# Patient Record
Sex: FEMALE | Race: WHITE | Employment: OTHER | URBAN - METROPOLITAN AREA
[De-identification: names, ages, dates, MRNs, and addresses within clinical notes are randomized per-mention and may not be internally consistent; named-entity substitution may affect disease eponyms.]

---

## 2017-01-09 ENCOUNTER — TELEPHONE (OUTPATIENT)
Dept: FAMILY MEDICINE CLINIC | Age: 72
End: 2017-01-09

## 2017-01-10 ENCOUNTER — OFFICE VISIT (OUTPATIENT)
Dept: INFECTIOUS DISEASES | Age: 72
End: 2017-01-10

## 2017-01-10 VITALS
BODY MASS INDEX: 25.49 KG/M2 | WEIGHT: 153 LBS | HEART RATE: 60 BPM | TEMPERATURE: 98.5 F | HEIGHT: 65 IN | SYSTOLIC BLOOD PRESSURE: 122 MMHG | DIASTOLIC BLOOD PRESSURE: 82 MMHG

## 2017-01-10 DIAGNOSIS — R82.71 ASYMPTOMATIC BACTERIURIA: Primary | ICD-10-CM

## 2017-01-10 PROCEDURE — 99203 OFFICE O/P NEW LOW 30 MIN: CPT | Performed by: INTERNAL MEDICINE

## 2017-01-30 ENCOUNTER — OFFICE VISIT (OUTPATIENT)
Dept: FAMILY MEDICINE CLINIC | Age: 72
End: 2017-01-30

## 2017-01-30 VITALS
WEIGHT: 155.8 LBS | SYSTOLIC BLOOD PRESSURE: 126 MMHG | BODY MASS INDEX: 25.96 KG/M2 | OXYGEN SATURATION: 99 % | DIASTOLIC BLOOD PRESSURE: 78 MMHG | HEART RATE: 59 BPM | RESPIRATION RATE: 17 BRPM | HEIGHT: 65 IN

## 2017-01-30 DIAGNOSIS — R19.7 DIARRHEA, UNSPECIFIED TYPE: Primary | ICD-10-CM

## 2017-01-30 DIAGNOSIS — G47.00 INSOMNIA, UNSPECIFIED TYPE: ICD-10-CM

## 2017-01-30 DIAGNOSIS — R41.3 MEMORY DISORDER: ICD-10-CM

## 2017-01-30 PROCEDURE — 99213 OFFICE O/P EST LOW 20 MIN: CPT | Performed by: FAMILY MEDICINE

## 2017-01-30 RX ORDER — ZOLPIDEM TARTRATE 6.25 MG/1
TABLET, FILM COATED, EXTENDED RELEASE ORAL
Qty: 90 TABLET | Refills: 0 | Status: SHIPPED | OUTPATIENT
Start: 2017-01-30 | End: 2017-04-04 | Stop reason: SDUPTHER

## 2017-01-30 ASSESSMENT — ENCOUNTER SYMPTOMS
DIARRHEA: 1
RESPIRATORY NEGATIVE: 1
BACK PAIN: 1

## 2017-03-03 ENCOUNTER — TELEPHONE (OUTPATIENT)
Dept: FAMILY MEDICINE CLINIC | Age: 72
End: 2017-03-03

## 2017-03-03 RX ORDER — MEMANTINE HYDROCHLORIDE 5 MG-10 MG
KIT ORAL
Qty: 1 PACKAGE | Refills: 0 | Status: SHIPPED | OUTPATIENT
Start: 2017-03-03 | End: 2017-03-03

## 2017-03-06 ENCOUNTER — HOSPITAL ENCOUNTER (OUTPATIENT)
Dept: ENDOSCOPY | Age: 72
Discharge: HOME | End: 2017-03-06
Attending: INTERNAL MEDICINE | Admitting: INTERNAL MEDICINE

## 2017-03-06 RX ORDER — SODIUM CHLORIDE 9 MG/ML
INJECTION, SOLUTION INTRAVENOUS CONTINUOUS
Status: DISCONTINUED | OUTPATIENT
Start: 2017-03-06 | End: 2017-03-07 | Stop reason: HOSPADM

## 2017-03-06 RX ORDER — SODIUM CHLORIDE 0.9 % (FLUSH) 0.9 %
10 SYRINGE (ML) INJECTION EVERY 12 HOURS SCHEDULED
Status: DISCONTINUED | OUTPATIENT
Start: 2017-03-06 | End: 2017-03-07 | Stop reason: HOSPADM

## 2017-03-06 RX ORDER — SODIUM CHLORIDE 0.9 % (FLUSH) 0.9 %
10 SYRINGE (ML) INJECTION PRN
Status: DISCONTINUED | OUTPATIENT
Start: 2017-03-06 | End: 2017-03-07 | Stop reason: HOSPADM

## 2017-03-06 ASSESSMENT — ENCOUNTER SYMPTOMS: SHORTNESS OF BREATH: 0

## 2017-03-17 DIAGNOSIS — I10 ESSENTIAL HYPERTENSION: ICD-10-CM

## 2017-03-18 RX ORDER — VALSARTAN 160 MG/1
TABLET ORAL
Qty: 30 TABLET | Refills: 5 | Status: SHIPPED | OUTPATIENT
Start: 2017-03-18 | End: 2017-07-27 | Stop reason: SDUPTHER

## 2017-03-23 ENCOUNTER — OFFICE VISIT (OUTPATIENT)
Dept: SURGERY | Age: 72
End: 2017-03-23

## 2017-03-23 VITALS — WEIGHT: 159 LBS | BODY MASS INDEX: 26.46 KG/M2 | SYSTOLIC BLOOD PRESSURE: 132 MMHG | DIASTOLIC BLOOD PRESSURE: 84 MMHG

## 2017-03-23 DIAGNOSIS — K63.3 COLON ULCER: ICD-10-CM

## 2017-03-23 DIAGNOSIS — D12.6 HIGH GRADE DYSPLASIA IN COLONIC ADENOMA: Primary | ICD-10-CM

## 2017-03-23 PROCEDURE — 99214 OFFICE O/P EST MOD 30 MIN: CPT | Performed by: SURGERY

## 2017-03-23 RX ORDER — FAMOTIDINE 40 MG/1
40 TABLET, FILM COATED ORAL PRN
COMMUNITY
End: 2017-03-24 | Stop reason: ALTCHOICE

## 2017-03-23 RX ORDER — VALACYCLOVIR HYDROCHLORIDE 500 MG/1
1 TABLET, FILM COATED ORAL PRN
COMMUNITY
Start: 2017-01-26 | End: 2018-03-23

## 2017-03-23 ASSESSMENT — ENCOUNTER SYMPTOMS
GASTROINTESTINAL NEGATIVE: 1
ALLERGIC/IMMUNOLOGIC NEGATIVE: 1
RESPIRATORY NEGATIVE: 1
BACK PAIN: 1
EYES NEGATIVE: 1

## 2017-03-25 DIAGNOSIS — R41.3 MEMORY DISORDER: ICD-10-CM

## 2017-03-27 RX ORDER — DONEPEZIL HYDROCHLORIDE 10 MG/1
TABLET, FILM COATED ORAL
Qty: 30 TABLET | Refills: 5 | Status: SHIPPED | OUTPATIENT
Start: 2017-03-27 | End: 2017-04-04

## 2017-03-28 DIAGNOSIS — D12.6 HIGH GRADE DYSPLASIA IN COLONIC ADENOMA: Primary | ICD-10-CM

## 2017-03-28 DIAGNOSIS — Z01.818 PRE-OP TESTING: ICD-10-CM

## 2017-03-29 ENCOUNTER — HOSPITAL ENCOUNTER (OUTPATIENT)
Dept: CT IMAGING | Age: 72
Discharge: HOME | End: 2017-03-29
Attending: INTERNAL MEDICINE | Admitting: INTERNAL MEDICINE

## 2017-03-29 ENCOUNTER — HOSPITAL ENCOUNTER (OUTPATIENT)
Dept: OTHER | Age: 72
Discharge: HOME | End: 2017-03-29
Attending: SURGERY | Admitting: SURGERY

## 2017-03-29 DIAGNOSIS — D12.6 BENIGN NEOPLASM OF COLON: ICD-10-CM

## 2017-03-29 DIAGNOSIS — D12.6 HIGH GRADE DYSPLASIA IN COLONIC ADENOMA: ICD-10-CM

## 2017-03-29 LAB
A/G RATIO: 1.5 (ref 1.1–2.2)
ABO/RH: NORMAL
ALBUMIN SERPL-MCNC: 4 G/DL (ref 3.4–5)
ALP BLD-CCNC: 85 U/L (ref 40–129)
ALT SERPL-CCNC: 14 U/L (ref 10–40)
ANION GAP SERPL CALCULATED.3IONS-SCNC: 19 MMOL/L (ref 3–16)
ANTIBODY SCREEN: NORMAL
AST SERPL-CCNC: 20 U/L (ref 15–37)
BASOPHILS ABSOLUTE: 0.1 K/UL (ref 0–0.2)
BASOPHILS RELATIVE PERCENT: 1 %
BILIRUB SERPL-MCNC: 0.7 MG/DL (ref 0–1)
BUN BLDV-MCNC: 20 MG/DL (ref 7–20)
CALCIUM SERPL-MCNC: 9.7 MG/DL (ref 8.3–10.6)
CEA: 3.5 NG/ML (ref 0–5)
CHLORIDE BLD-SCNC: 100 MMOL/L (ref 99–110)
CO2: 23 MMOL/L (ref 21–32)
CREAT SERPL-MCNC: 0.6 MG/DL (ref 0.6–1.2)
EOSINOPHILS ABSOLUTE: 0.2 K/UL (ref 0–0.6)
EOSINOPHILS RELATIVE PERCENT: 4 %
GFR AFRICAN AMERICAN: >60
GFR NON-AFRICAN AMERICAN: >60
GLOBULIN: 2.7 G/DL
GLUCOSE BLD-MCNC: 94 MG/DL (ref 70–99)
HCT VFR BLD CALC: 42.8 % (ref 36–48)
HEMOGLOBIN: 14 G/DL (ref 12–16)
LYMPHOCYTES ABSOLUTE: 2.2 K/UL (ref 1–5.1)
LYMPHOCYTES RELATIVE PERCENT: 38 %
MCH RBC QN AUTO: 29.8 PG (ref 26–34)
MCHC RBC AUTO-ENTMCNC: 32.7 G/DL (ref 31–36)
MCV RBC AUTO: 91.1 FL (ref 80–100)
MONOCYTES ABSOLUTE: 0.5 K/UL (ref 0–1.3)
MONOCYTES RELATIVE PERCENT: 8.6 %
NEUTROPHILS ABSOLUTE: 2.8 K/UL (ref 1.7–7.7)
NEUTROPHILS RELATIVE PERCENT: 48.4 %
PDW BLD-RTO: 14.3 % (ref 12.4–15.4)
PLATELET # BLD: 272 K/UL (ref 135–450)
PMV BLD AUTO: 8.9 FL (ref 5–10.5)
POTASSIUM SERPL-SCNC: 4.1 MMOL/L (ref 3.5–5.1)
RBC # BLD: 4.7 M/UL (ref 4–5.2)
SODIUM BLD-SCNC: 142 MMOL/L (ref 136–145)
TOTAL PROTEIN: 6.7 G/DL (ref 6.4–8.2)
WBC # BLD: 5.7 K/UL (ref 4–11)

## 2017-04-04 ENCOUNTER — OFFICE VISIT (OUTPATIENT)
Dept: FAMILY MEDICINE CLINIC | Age: 72
End: 2017-04-04

## 2017-04-04 VITALS
SYSTOLIC BLOOD PRESSURE: 126 MMHG | DIASTOLIC BLOOD PRESSURE: 82 MMHG | HEIGHT: 65 IN | RESPIRATION RATE: 12 BRPM | WEIGHT: 156 LBS | OXYGEN SATURATION: 98 % | TEMPERATURE: 97.9 F | HEART RATE: 65 BPM | BODY MASS INDEX: 25.99 KG/M2

## 2017-04-04 DIAGNOSIS — G47.00 INSOMNIA, UNSPECIFIED TYPE: ICD-10-CM

## 2017-04-04 DIAGNOSIS — K58.9 IRRITABLE BOWEL SYNDROME WITHOUT DIARRHEA: ICD-10-CM

## 2017-04-04 DIAGNOSIS — K22.719 BARRETT'S ESOPHAGUS WITH DYSPLASIA: ICD-10-CM

## 2017-04-04 DIAGNOSIS — I10 ESSENTIAL HYPERTENSION: ICD-10-CM

## 2017-04-04 DIAGNOSIS — Z01.818 PRE-OP EXAMINATION: Primary | ICD-10-CM

## 2017-04-04 PROCEDURE — 93000 ELECTROCARDIOGRAM COMPLETE: CPT | Performed by: NURSE PRACTITIONER

## 2017-04-04 PROCEDURE — 99215 OFFICE O/P EST HI 40 MIN: CPT | Performed by: NURSE PRACTITIONER

## 2017-04-04 RX ORDER — ZOLPIDEM TARTRATE 6.25 MG/1
TABLET, FILM COATED, EXTENDED RELEASE ORAL
Qty: 90 TABLET | Refills: 0 | Status: SHIPPED | OUTPATIENT
Start: 2017-04-04 | End: 2017-08-08 | Stop reason: SDUPTHER

## 2017-04-13 ENCOUNTER — CARE COORDINATOR VISIT (OUTPATIENT)
Dept: CASE MANAGEMENT | Age: 72
End: 2017-04-13

## 2017-04-14 ENCOUNTER — CARE COORDINATION (OUTPATIENT)
Dept: CASE MANAGEMENT | Age: 72
End: 2017-04-14

## 2017-04-14 ENCOUNTER — TELEPHONE (OUTPATIENT)
Dept: PHARMACY | Facility: CLINIC | Age: 72
End: 2017-04-14

## 2017-04-15 ENCOUNTER — CARE COORDINATION (OUTPATIENT)
Dept: CASE MANAGEMENT | Age: 72
End: 2017-04-15

## 2017-04-16 ENCOUNTER — CARE COORDINATION (OUTPATIENT)
Dept: CASE MANAGEMENT | Age: 72
End: 2017-04-16

## 2017-04-17 ENCOUNTER — TELEPHONE (OUTPATIENT)
Dept: SURGERY | Age: 72
End: 2017-04-17

## 2017-04-18 ENCOUNTER — CARE COORDINATION (OUTPATIENT)
Dept: CASE MANAGEMENT | Age: 72
End: 2017-04-18

## 2017-04-26 ENCOUNTER — TELEPHONE (OUTPATIENT)
Dept: FAMILY MEDICINE CLINIC | Age: 72
End: 2017-04-26

## 2017-04-27 ENCOUNTER — OFFICE VISIT (OUTPATIENT)
Dept: SURGERY | Age: 72
End: 2017-04-27

## 2017-04-27 VITALS — WEIGHT: 149 LBS | SYSTOLIC BLOOD PRESSURE: 102 MMHG | BODY MASS INDEX: 24.79 KG/M2 | DIASTOLIC BLOOD PRESSURE: 70 MMHG

## 2017-04-27 DIAGNOSIS — C18.9 MALIGNANT NEOPLASM OF COLON, UNSPECIFIED PART OF COLON (HCC): ICD-10-CM

## 2017-04-27 DIAGNOSIS — D12.6 HIGH GRADE DYSPLASIA IN COLONIC ADENOMA: Primary | ICD-10-CM

## 2017-04-27 PROCEDURE — 99024 POSTOP FOLLOW-UP VISIT: CPT | Performed by: SURGERY

## 2017-05-01 ENCOUNTER — CARE COORDINATION (OUTPATIENT)
Dept: CASE MANAGEMENT | Age: 72
End: 2017-05-01

## 2017-05-02 ENCOUNTER — CARE COORDINATION (OUTPATIENT)
Dept: CASE MANAGEMENT | Age: 72
End: 2017-05-02

## 2017-05-05 ENCOUNTER — CARE COORDINATION (OUTPATIENT)
Dept: CASE MANAGEMENT | Age: 72
End: 2017-05-05

## 2017-05-08 ENCOUNTER — OFFICE VISIT (OUTPATIENT)
Dept: FAMILY MEDICINE CLINIC | Age: 72
End: 2017-05-08

## 2017-05-08 VITALS
RESPIRATION RATE: 14 BRPM | BODY MASS INDEX: 24.79 KG/M2 | HEIGHT: 65 IN | OXYGEN SATURATION: 98 % | HEART RATE: 60 BPM | WEIGHT: 148.8 LBS | DIASTOLIC BLOOD PRESSURE: 80 MMHG | SYSTOLIC BLOOD PRESSURE: 136 MMHG

## 2017-05-08 DIAGNOSIS — G47.09 OTHER INSOMNIA: Primary | ICD-10-CM

## 2017-05-08 PROCEDURE — G8510 SCR DEP NEG, NO PLAN REQD: HCPCS | Performed by: FAMILY MEDICINE

## 2017-05-08 PROCEDURE — 99213 OFFICE O/P EST LOW 20 MIN: CPT | Performed by: FAMILY MEDICINE

## 2017-05-08 PROCEDURE — 3288F FALL RISK ASSESSMENT DOCD: CPT | Performed by: FAMILY MEDICINE

## 2017-05-08 RX ORDER — TRAZODONE HYDROCHLORIDE 50 MG/1
TABLET ORAL
Qty: 60 TABLET | Refills: 3 | Status: SHIPPED | OUTPATIENT
Start: 2017-05-08 | End: 2018-04-17

## 2017-05-08 ASSESSMENT — ENCOUNTER SYMPTOMS
RESPIRATORY NEGATIVE: 1
GASTROINTESTINAL NEGATIVE: 1

## 2017-05-08 ASSESSMENT — PATIENT HEALTH QUESTIONNAIRE - PHQ9
SUM OF ALL RESPONSES TO PHQ9 QUESTIONS 1 & 2: 0
SUM OF ALL RESPONSES TO PHQ QUESTIONS 1-9: 0
2. FEELING DOWN, DEPRESSED OR HOPELESS: 0
1. LITTLE INTEREST OR PLEASURE IN DOING THINGS: 0

## 2017-05-16 ENCOUNTER — TELEPHONE (OUTPATIENT)
Dept: FAMILY MEDICINE CLINIC | Age: 72
End: 2017-05-16

## 2017-05-17 ENCOUNTER — TELEPHONE (OUTPATIENT)
Dept: FAMILY MEDICINE CLINIC | Age: 72
End: 2017-05-17

## 2017-05-17 RX ORDER — RAMELTEON 8 MG/1
8 TABLET ORAL NIGHTLY
Qty: 30 TABLET | Refills: 0 | Status: SHIPPED | OUTPATIENT
Start: 2017-05-17 | End: 2017-06-16

## 2017-06-03 PROBLEM — C18.4 MALIGNANT NEOPLASM OF TRANSVERSE COLON (HCC): Status: ACTIVE | Noted: 2017-06-03

## 2017-07-27 DIAGNOSIS — K22.719 BARRETT'S ESOPHAGUS WITH DYSPLASIA: ICD-10-CM

## 2017-07-27 DIAGNOSIS — G89.29 CHRONIC BILATERAL LOW BACK PAIN WITHOUT SCIATICA: ICD-10-CM

## 2017-07-27 DIAGNOSIS — I10 ESSENTIAL HYPERTENSION: ICD-10-CM

## 2017-07-27 DIAGNOSIS — M54.50 CHRONIC BILATERAL LOW BACK PAIN WITHOUT SCIATICA: ICD-10-CM

## 2017-07-27 RX ORDER — LANSOPRAZOLE 30 MG/1
CAPSULE, DELAYED RELEASE ORAL
Qty: 90 CAPSULE | Refills: 3 | Status: SHIPPED | OUTPATIENT
Start: 2017-07-27 | End: 2018-07-30 | Stop reason: SDUPTHER

## 2017-07-27 RX ORDER — CELECOXIB 200 MG/1
200 CAPSULE ORAL DAILY
Qty: 90 CAPSULE | Refills: 3 | Status: SHIPPED | OUTPATIENT
Start: 2017-07-27 | End: 2017-08-24 | Stop reason: SDUPTHER

## 2017-07-27 RX ORDER — DULOXETIN HYDROCHLORIDE 30 MG/1
30 CAPSULE, DELAYED RELEASE ORAL DAILY
Qty: 90 CAPSULE | Refills: 3 | Status: SHIPPED | OUTPATIENT
Start: 2017-07-27 | End: 2017-08-24 | Stop reason: SDUPTHER

## 2017-07-27 RX ORDER — VALSARTAN 160 MG/1
TABLET ORAL
Qty: 90 TABLET | Refills: 3 | Status: SHIPPED | OUTPATIENT
Start: 2017-07-27 | End: 2018-07-27 | Stop reason: SDUPTHER

## 2017-08-08 DIAGNOSIS — G47.00 INSOMNIA, UNSPECIFIED TYPE: ICD-10-CM

## 2017-08-08 RX ORDER — ZOLPIDEM TARTRATE 6.25 MG/1
TABLET, FILM COATED, EXTENDED RELEASE ORAL
Qty: 90 TABLET | Refills: 0 | Status: SHIPPED | OUTPATIENT
Start: 2017-08-08 | End: 2017-11-01 | Stop reason: SDUPTHER

## 2017-08-24 DIAGNOSIS — G89.29 CHRONIC BILATERAL LOW BACK PAIN WITHOUT SCIATICA: ICD-10-CM

## 2017-08-24 DIAGNOSIS — M54.50 CHRONIC BILATERAL LOW BACK PAIN WITHOUT SCIATICA: ICD-10-CM

## 2017-08-24 RX ORDER — DULOXETIN HYDROCHLORIDE 30 MG/1
30 CAPSULE, DELAYED RELEASE ORAL DAILY
Qty: 90 CAPSULE | Refills: 1 | Status: SHIPPED | OUTPATIENT
Start: 2017-08-24 | End: 2018-08-13 | Stop reason: SDUPTHER

## 2017-08-24 RX ORDER — CELECOXIB 200 MG/1
200 CAPSULE ORAL DAILY PRN
Qty: 90 CAPSULE | Refills: 1 | Status: SHIPPED | OUTPATIENT
Start: 2017-08-24 | End: 2018-05-14 | Stop reason: SDUPTHER

## 2017-09-05 ENCOUNTER — TELEPHONE (OUTPATIENT)
Dept: FAMILY MEDICINE CLINIC | Age: 72
End: 2017-09-05

## 2017-09-05 DIAGNOSIS — G47.00 INSOMNIA, UNSPECIFIED TYPE: ICD-10-CM

## 2017-09-05 DIAGNOSIS — G89.29 CHRONIC BILATERAL LOW BACK PAIN WITHOUT SCIATICA: ICD-10-CM

## 2017-09-05 DIAGNOSIS — M54.50 CHRONIC BILATERAL LOW BACK PAIN WITHOUT SCIATICA: ICD-10-CM

## 2017-09-05 RX ORDER — ZOLPIDEM TARTRATE 6.25 MG/1
TABLET, FILM COATED, EXTENDED RELEASE ORAL
Qty: 90 TABLET | Refills: 3 | Status: CANCELLED | OUTPATIENT
Start: 2017-09-05

## 2017-09-05 RX ORDER — DULOXETIN HYDROCHLORIDE 30 MG/1
30 CAPSULE, DELAYED RELEASE ORAL DAILY
Qty: 90 CAPSULE | Refills: 2 | Status: CANCELLED | OUTPATIENT
Start: 2017-09-05

## 2017-11-01 DIAGNOSIS — G47.00 INSOMNIA, UNSPECIFIED TYPE: ICD-10-CM

## 2017-11-01 RX ORDER — ZOLPIDEM TARTRATE 6.25 MG/1
TABLET, FILM COATED, EXTENDED RELEASE ORAL
Qty: 90 TABLET | Refills: 0 | Status: SHIPPED | OUTPATIENT
Start: 2017-11-01 | End: 2017-11-06 | Stop reason: SDUPTHER

## 2017-11-01 NOTE — TELEPHONE ENCOUNTER
Medication and Quantity requested: zolpidem (AMBIEN CR) 6.25 MG extended release tablet  Qty 90     Last Visit  5/8/17, Dr. Jefrfy Urena and phone number updated in EPIC:  yes    Pt states PA may be needed. [facility] provided pt with phone number [phone]. Pt states if PA is needed and not approved she wants medication sent to [address] [place] [address] on [place] [address].

## 2017-11-04 DIAGNOSIS — G47.00 INSOMNIA, UNSPECIFIED TYPE: ICD-10-CM

## 2017-11-06 DIAGNOSIS — G47.00 INSOMNIA, UNSPECIFIED TYPE: ICD-10-CM

## 2017-11-06 RX ORDER — ZOLPIDEM TARTRATE 6.25 MG/1
TABLET, FILM COATED, EXTENDED RELEASE ORAL
Qty: 30 TABLET | Refills: 0 | Status: SHIPPED | OUTPATIENT
Start: 2017-11-06 | End: 2017-11-07

## 2017-11-06 NOTE — TELEPHONE ENCOUNTER
Last refill: 11/01/2017, #90, 0 refills    Patient requesting a 30 day supply while she gets her #90 approved through [facility]. Dr. Dontrell Silveira is out of the office.

## 2017-11-06 NOTE — TELEPHONE ENCOUNTER
Patient is requesting a 30 day supply of Zolpidem sent to [facility]. Patient is out of medication and can not wait until PA is approved for medication at [address]. Patient is willing to pay full price for the RX at [facility] 5-8-17  Patient has appt.  On 11-17-17 w/

## 2017-11-07 RX ORDER — ZOLPIDEM TARTRATE 6.25 MG/1
TABLET, FILM COATED, EXTENDED RELEASE ORAL
Qty: 30 TABLET | Refills: 0 | Status: SHIPPED | OUTPATIENT
Start: 2017-11-07 | End: 2018-02-06 | Stop reason: SDUPTHER

## 2017-11-14 LAB
BILIRUBIN, URINE: NEGATIVE
BLOOD, URINE: NEGATIVE
CLARITY: ABNORMAL
COLOR: ABNORMAL
GLUCOSE URINE: NEGATIVE
KETONES, URINE: NEGATIVE
LEUKOCYTE ESTERASE, URINE: ABNORMAL
NITRITE, URINE: POSITIVE
PH UA: 6 (ref 4.5–8)
PROTEIN UA: NEGATIVE
SPECIFIC GRAVITY, URINE: 1.02
UROBILINOGEN, URINE: NORMAL

## 2017-11-17 ENCOUNTER — OFFICE VISIT (OUTPATIENT)
Dept: FAMILY MEDICINE CLINIC | Age: 72
End: 2017-11-17

## 2017-11-17 VITALS
HEART RATE: 72 BPM | HEIGHT: 64 IN | DIASTOLIC BLOOD PRESSURE: 68 MMHG | WEIGHT: 151 LBS | SYSTOLIC BLOOD PRESSURE: 132 MMHG | TEMPERATURE: 98.1 F | BODY MASS INDEX: 25.78 KG/M2

## 2017-11-17 DIAGNOSIS — K58.9 IRRITABLE BOWEL SYNDROME WITHOUT DIARRHEA: ICD-10-CM

## 2017-11-17 DIAGNOSIS — D37.4 VILLOUS ADENOMA OF COLON: ICD-10-CM

## 2017-11-17 DIAGNOSIS — R41.3 MEMORY DISORDER: ICD-10-CM

## 2017-11-17 DIAGNOSIS — M16.11 PRIMARY OSTEOARTHRITIS OF RIGHT HIP: Primary | ICD-10-CM

## 2017-11-17 DIAGNOSIS — G47.09 OTHER INSOMNIA: ICD-10-CM

## 2017-11-17 DIAGNOSIS — Z01.818 PREOP EXAMINATION: ICD-10-CM

## 2017-11-17 DIAGNOSIS — K22.719 BARRETT'S ESOPHAGUS WITH DYSPLASIA: ICD-10-CM

## 2017-11-17 DIAGNOSIS — I10 ESSENTIAL HYPERTENSION: ICD-10-CM

## 2017-11-17 DIAGNOSIS — M54.50 CHRONIC BILATERAL LOW BACK PAIN WITHOUT SCIATICA: ICD-10-CM

## 2017-11-17 DIAGNOSIS — M26.609 TMJ (TEMPOROMANDIBULAR JOINT DISORDER): ICD-10-CM

## 2017-11-17 DIAGNOSIS — G89.29 CHRONIC BILATERAL LOW BACK PAIN WITHOUT SCIATICA: ICD-10-CM

## 2017-11-17 DIAGNOSIS — R82.71 BACTERIURIA: ICD-10-CM

## 2017-11-17 PROCEDURE — 99214 OFFICE O/P EST MOD 30 MIN: CPT | Performed by: FAMILY MEDICINE

## 2017-11-17 NOTE — PROGRESS NOTES
Chief Complaint:     Dov Alberts is a 81 y.o. female who presents for a preoperative physical examination. She is scheduled to have R THR done by Dr. Jeremy Smith at [facility] on 11/29/2017.     History of Present Illness:      DJD with pain and decreased activity  Previous Left THR    Past Medical History:   Diagnosis Date    Esophagus, Khanna's     Essential hypertension 11/28/2016    GERD (gastroesophageal reflux disease)     HH (hiatus hernia) 07/25/11    small    IBS (irritable bowel syndrome)     Insomnia     Memory disorder 11/28/2016    TMJ (temporomandibular joint disorder)         Review of patient's past surgical history indicates:     Past Surgical History:   Procedure Laterality Date    COLON SURGERY  04/10/2017    transverse colectomy - Dr. Frank Ayala  2000,2003,2006,11    3 years: polyps    EYE SURGERY      cataract    SINUS SURGERY  2009    TONSILLECTOMY      TOTAL HIP ARTHROPLASTY Left     2016    UPPER GASTROINTESTINAL ENDOSCOPY  2004,2006    UPPER GASTROINTESTINAL ENDOSCOPY  07/25/11    with biopsy, and with esophageal balloon dilation                                                   Current Outpatient Prescriptions   Medication Sig Dispense Refill    zolpidem (AMBIEN CR) 6.25 MG extended release tablet TAKE ONE TABLET BY MOUTH EVERY NIGHT AT BEDTIME AS NEEDED 30 tablet 0    DULoxetine (CYMBALTA) 30 MG extended release capsule Take 1 capsule by mouth daily 90 capsule 1    celecoxib (CELEBREX) 200 MG capsule Take 1 capsule by mouth daily as needed for Pain with food 90 capsule 1    lansoprazole (PREVACID) 30 MG delayed release capsule TAKE ONE CAPSULE BY MOUTH DAILY 90 capsule 3    valsartan (DIOVAN) 160 MG tablet TAKE ONE TABLET BY MOUTH DAILY 90 tablet 3    traZODone (DESYREL) 50 MG tablet 1-2 tab hs 60 tablet 3    acetaminophen (TYLENOL) 325 MG tablet Take 2 tablets by mouth every 4 hours as needed for Pain 120 tablet 3    valACYclovir (VALTREX) 500 MG tablet Take 1 tablet by mouth as needed      Cholecalciferol (VITAMIN D) 2000 UNITS CAPS capsule Take 1 capsule by mouth daily       calcium carbonate (OSCAL) 500 MG TABS tablet Take 500 mg by mouth daily       No current facility-administered medications for this visit. No Known Allergies    Social History   Substance Use Topics    Smoking status: Former Smoker     Packs/day: 0.50     Years: 15.00     Types: Cigarettes     Quit date: 9/4/2002    Smokeless tobacco: Never Used    Alcohol use 3.6 oz/week     6 Glasses of wine per week        Family History   Problem Relation Age of Onset    Colon Cancer Mother     Other Father      diverticulitis    Hypertension Father         Review Of Systems    Skin: negative   Eyes: negative  Ears/Nose/Throat: negative  Respiratory: negative  Cardiovascular: negative  Gastrointestinal: negative  Genitourinary: The patient states she has chronic asymptomatic bacteriuria which is never cleared with antibiotics. She has been seen multiple times by a urologist who has told her to not treat this as long as it is asymptomatic. She currently has a urine culture pending at appears to have again an asymptomatic bacteriuria. She denies any symptoms associated with a UTI. Musculoskeletal: per HPI  Neurologic: negative  Psychiatric: Chronic insomnia for which she takes zolpidem  Hematologic/Lymphatic/Immunologic: negative  Endocrine: negative    PHYSICAL EXAMINATION:  /68 (Site: Left Arm, Position: Sitting)   Pulse 72   Temp 98.1 °F (36.7 °C) (Tympanic)   Ht 5' 4.25\" (1.632 m)   Wt 151 lb (68.5 kg)   BMI 25.72 kg/m²   General appearance -  alert, no distress  Skin - Skin color, texture, turgor normal. No rashes or lesions. Head - Normocephalic. No abnormalities  Eyes -  conjunctivae/corneas clear. PERRL, EOM's intact. Ears - External ears normal. Canals clear. TM's normal.  Nose/Sinuses -  No drainage or sinus tenderness. Oropharynx - clear  Neck - Neck supple.  No

## 2017-11-17 NOTE — PATIENT INSTRUCTIONS
Patient Education        Hip Arthritis: Care Instructions  Your Care Instructions    Arthritis, also called osteoarthritis, is a breakdown of the tissue (cartilage) that cushions your joints. Many people have some arthritis as they age. When the cartilage in your hip joints wears down, your hip bone rubs against the hip socket. This causes pain and stiffness. Work with your doctor to find the right mix of treatments for your arthritis. There are things you can do at home to protect your hip joints, ease your pain, and help you stay active. But if your arthritis becomes so bad that you cannot walk, you may need surgery to replace the hip joint. Follow-up care is a key part of your treatment and safety. Be sure to make and go to all appointments, and call your doctor if you are having problems. Its also a good idea to know your test results and keep a list of the medicines you take. How can you care for yourself at home? · Stay at a healthy weight. Being overweight puts extra strain on your hip joints. · Talk to your doctor or physical therapist about exercises that will help ease hip pain. These tips may help. ¨ Stretch to help prevent stiffness and to prevent injury before you exercise. You may enjoy gentle forms of yoga to help keep your joints and muscles flexible. ¨ Walk instead of jog. Other types of exercise that are less stressful on the joints include riding a bike, swimming, and doing water exercise. ¨ Lift weights. Strong muscles help reduce stress on your joints. Stronger thigh muscles, for example, take some of the stress off of the knees and hips. Learn the right way to lift weights so you do not make joint pain worse. · Take pain medicines exactly as directed. ¨ If the doctor gave you a prescription medicine for pain, take it as prescribed. ¨ If you are not taking a prescription pain medicine, ask your doctor if you can take an over-the-counter medicine.   · Use a cane, crutch, walker, or

## 2017-11-20 ENCOUNTER — TELEPHONE (OUTPATIENT)
Dept: FAMILY MEDICINE CLINIC | Age: 72
End: 2017-11-20

## 2017-11-21 NOTE — TELEPHONE ENCOUNTER
UA results were received, but not a urine culture. I contacted Dr. Kendrick Anne office, but they are closed for the evening. I will call back tomorrow.

## 2017-11-22 RX ORDER — NITROFURANTOIN 25; 75 MG/1; MG/1
100 CAPSULE ORAL 2 TIMES DAILY
Qty: 14 CAPSULE | Refills: 0 | Status: SHIPPED | OUTPATIENT
Start: 2017-11-22 | End: 2017-11-29

## 2017-11-22 NOTE — TELEPHONE ENCOUNTER
I contacted Dr. Devon Dixon office at: [phone]. The nurse stated that there was a urine culture done and is faxing the results over.

## 2017-12-13 ENCOUNTER — TELEPHONE (OUTPATIENT)
Dept: FAMILY MEDICINE CLINIC | Age: 72
End: 2017-12-13

## 2017-12-13 DIAGNOSIS — Z12.39 SCREENING FOR BREAST CANCER: Primary | ICD-10-CM

## 2017-12-14 RX ORDER — AMMONIUM LACTATE 12 G/100G
CREAM TOPICAL
Qty: 1 TUBE | Refills: 5 | Status: SHIPPED | OUTPATIENT
Start: 2017-12-14 | End: 2019-04-09 | Stop reason: SDUPTHER

## 2017-12-21 ENCOUNTER — HOSPITAL ENCOUNTER (OUTPATIENT)
Dept: MAMMOGRAPHY | Age: 72
Discharge: HOME | End: 2017-12-21
Attending: FAMILY MEDICINE | Admitting: FAMILY MEDICINE

## 2017-12-21 DIAGNOSIS — Z12.39 SCREENING FOR BREAST CANCER: ICD-10-CM

## 2018-02-06 ENCOUNTER — OFFICE VISIT (OUTPATIENT)
Dept: FAMILY MEDICINE CLINIC | Age: 73
End: 2018-02-06

## 2018-02-06 VITALS
HEIGHT: 65 IN | HEART RATE: 61 BPM | SYSTOLIC BLOOD PRESSURE: 132 MMHG | OXYGEN SATURATION: 98 % | WEIGHT: 150 LBS | BODY MASS INDEX: 24.99 KG/M2 | DIASTOLIC BLOOD PRESSURE: 80 MMHG

## 2018-02-06 DIAGNOSIS — I10 ESSENTIAL HYPERTENSION: ICD-10-CM

## 2018-02-06 DIAGNOSIS — G47.00 INSOMNIA, UNSPECIFIED TYPE: ICD-10-CM

## 2018-02-06 DIAGNOSIS — Z00.00 ROUTINE GENERAL MEDICAL EXAMINATION AT A HEALTH CARE FACILITY: ICD-10-CM

## 2018-02-06 DIAGNOSIS — Z00.00 MEDICARE ANNUAL WELLNESS VISIT, SUBSEQUENT: Primary | ICD-10-CM

## 2018-02-06 DIAGNOSIS — D37.4 VILLOUS ADENOMA OF COLON: ICD-10-CM

## 2018-02-06 PROCEDURE — G0439 PPPS, SUBSEQ VISIT: HCPCS | Performed by: FAMILY MEDICINE

## 2018-02-06 RX ORDER — ZOLPIDEM TARTRATE 6.25 MG/1
TABLET, FILM COATED, EXTENDED RELEASE ORAL
Qty: 90 TABLET | Refills: 0 | Status: SHIPPED | OUTPATIENT
Start: 2018-02-06 | End: 2018-05-17 | Stop reason: SDUPTHER

## 2018-02-06 ASSESSMENT — LIFESTYLE VARIABLES
AUDIT TOTAL SCORE: 7
HOW MANY STANDARD DRINKS CONTAINING ALCOHOL DO YOU HAVE ON A TYPICAL DAY: 1
HOW OFTEN DURING THE LAST YEAR HAVE YOU HAD A FEELING OF GUILT OR REMORSE AFTER DRINKING: 0
HAS A RELATIVE, FRIEND, DOCTOR, OR ANOTHER HEALTH PROFESSIONAL EXPRESSED CONCERN ABOUT YOUR DRINKING OR SUGGESTED YOU CUT DOWN: 0
AUDIT-C TOTAL SCORE: 7
HAVE YOU OR SOMEONE ELSE BEEN INJURED AS A RESULT OF YOUR DRINKING: 0
HOW OFTEN DURING THE LAST YEAR HAVE YOU FOUND THAT YOU WERE NOT ABLE TO STOP DRINKING ONCE YOU HAD STARTED: 0
HOW OFTEN DURING THE LAST YEAR HAVE YOU BEEN UNABLE TO REMEMBER WHAT HAPPENED THE NIGHT BEFORE BECAUSE YOU HAD BEEN DRINKING: 0
HOW OFTEN DO YOU HAVE SIX OR MORE DRINKS ON ONE OCCASION: 2
HOW OFTEN DURING THE LAST YEAR HAVE YOU NEEDED AN ALCOHOLIC DRINK FIRST THING IN THE MORNING TO GET YOURSELF GOING AFTER A NIGHT OF HEAVY DRINKING: 0
HOW OFTEN DURING THE LAST YEAR HAVE YOU FAILED TO DO WHAT WAS NORMALLY EXPECTED FROM YOU BECAUSE OF DRINKING: 0
HOW OFTEN DO YOU HAVE A DRINK CONTAINING ALCOHOL: 4

## 2018-02-06 ASSESSMENT — ANXIETY QUESTIONNAIRES: GAD7 TOTAL SCORE: 0

## 2018-02-06 ASSESSMENT — PATIENT HEALTH QUESTIONNAIRE - PHQ9: SUM OF ALL RESPONSES TO PHQ QUESTIONS 1-9: 0

## 2018-02-07 NOTE — PROGRESS NOTES
Medicare Annual Wellness Visit  Name: Julian Young Date: 2018   MRN: [id number] Sex: Female   Age: 81 y.o. Ethnicity: Non-/Non    : 1945 Race: Perfecto Abrams is here for Medicare AWV    Screenings for behavioral, psychosocial and functional/safety risks, and cognitive dysfunction are all negative except as indicated below. These results, as well as other patient data from the [address] form, are documented in Flowsheets linked to this Encounter. No Known Allergies  Prior to Visit Medications    Medication Sig Taking? Authorizing Provider   zolpidem (AMBIEN CR) 6.25 MG extended release tablet TAKE ONE TABLET BY MOUTH EVERY NIGHT AT BEDTIME AS NEEDED.  Yes Aure Pool MD   ammonium lactate (AMLACTIN) 12 % cream APPLY TO AFFECTED AREA(S) ONCE A DAY AS NEEDED Yes Aure Pool MD   DULoxetine (CYMBALTA) 30 MG extended release capsule Take 1 capsule by mouth daily Yes Aure Pool MD   celecoxib (CELEBREX) 200 MG capsule Take 1 capsule by mouth daily as needed for Pain with food Yes Aure Pool MD   lansoprazole (PREVACID) 30 MG delayed release capsule TAKE ONE CAPSULE BY MOUTH DAILY Yes Aure Pool MD   valsartan (DIOVAN) 160 MG tablet TAKE ONE TABLET BY MOUTH DAILY Yes Aure Pool MD   valACYclovir (VALTREX) 500 MG tablet Take 1 tablet by mouth as needed Yes Historical Provider, MD   Cholecalciferol (VITAMIN D) 2000 UNITS CAPS capsule Take 1 capsule by mouth daily  Yes Historical Provider, MD   calcium carbonate (OSCAL) 500 MG TABS tablet Take 500 mg by mouth daily Yes Historical Provider, MD   traZODone (DESYREL) 50 MG tablet 1-2 tab hs  Aure Pool MD   acetaminophen (TYLENOL) 325 MG tablet Take 2 tablets by mouth every 4 hours as needed for Pain  GIANNA Barbosa     Past Medical History:   Diagnosis Date    Esophagus, Khanna's     Essential hypertension 2016    GERD (gastroesophageal reflux disease)     HH (hiatus hernia) 07/25/11    small    IBS (irritable bowel syndrome)     Insomnia     Memory disorder 11/28/2016    TMJ (temporomandibular joint disorder)      Past Surgical History:   Procedure Laterality Date    COLON SURGERY  04/10/2017    transverse colectomy - Dr. Tl Oliveira  [phone]    3 years: polyps    EYE SURGERY      cataract    JOINT REPLACEMENT Right 11/2017    SINUS SURGERY  2009    TONSILLECTOMY      TOTAL HIP ARTHROPLASTY Left     2016    UPPER GASTROINTESTINAL ENDOSCOPY  [id number]    UPPER GASTROINTESTINAL ENDOSCOPY  07/25/11    with biopsy, and with esophageal balloon dilation     Family History   Problem Relation Age of Onset    Colon Cancer Mother     Other Father      diverticulitis    Hypertension Father        CareTeam (Including outside providers/suppliers regularly involved in providing care):   Patient Care Team:  Angeli Kilgore MD as PCP - General (Family Medicine)  Nyla Ribera MD as PCP - Hematology/Oncology (Hematology and Oncology)  Angeli Kilgore MD as PCP - S Attributed Provider  Cayden Gilbert MD as Consulting Physician (Urology)  Moni Darling MD (Rheumatology)  Jake Buckley MD (Obstetrics & Gynecology)  Bar Peraza. (Dermatology)  Gilberto Constantino MD as Consulting Physician (Dermatology)  Hanane Roger MD as Consulting Physician (Gastroenterology)  Orlando Grey MD as Referring Physician (General Surgery)    Wt Readings from Last 3 Encounters:   02/06/18 150 lb (68 kg)   11/17/17 151 lb (68.5 kg)   06/02/17 151 lb (68.5 kg)     Vitals:    02/06/18 1104   BP: 132/80   Site: Left Arm   Position: Sitting   Cuff Size: Small Adult   Pulse: 61   SpO2: 98%   Weight: 150 lb (68 kg)   Height: 5' 4.5\" (1.638 m)       Vitals:    02/06/18 1104   BP: 132/80   Site: Left Arm   Position: Sitting   Cuff Size: Small Adult   Pulse: 61   SpO2: 98%   Weight: 150 lb (68 kg)   Height: 5' 4.5\" (1.638 m)     Body mass index is 25.35 kg/m².    General Appearance: alert and oriented, in no acute distress  Skin: warm and dry, no rash or erythema  Head: normocephalic and atraumatic  Eyes: pupils equal, round, and reactive to light, extraocular eye movements intact, conjunctivae normal  ENT: tympanic membrane, external ear and ear canal normal bilaterally, nose without deformity,   Neck: supple and non-tender without mass, no thyromegaly or thyroid nodules, no cervical lymphadenopathy  Pulmonary/Chest: clear to auscultation bilaterally- no wheezes, rales or rhonchi, normal air movement, no respiratory distress  Cardiovascular: normal rate, regular rhythm, normal S1 and S2, no murmurs, rubs, clicks, or gallops, no carotid bruits  Abdomen: soft, non-tender, non-distended, normal bowel sounds, no masses or organomegaly  Extremities: no cyanosis, clubbing or edema  Neurologic: reflexes normal and symmetric, no cranial nerve deficit, speech normal  GYN:Rectal: deferred to Gynecologist  Breast: deferred to Gynecologist     Patient's complete Health Risk Assessment and screening values have been reviewed and are found in Flowsheets. The following problems were reviewed today and where indicated follow up appointments were made and/or referrals ordered. Positive Risk Factor Screenings with Interventions:     General Health:  General  In general, how would you say your health is?: Good  In the past 7 days, have you experienced any of the following?: (!) Stress  Do you get the social and emotional support that you need?: Yes  Do you have a Living Will?: Yes  General Health Risk Interventions:  · None indicated    Health Habits/Nutrition:  Health Habits/Nutrition  Do you exercise for at least 20 minutes 2-3 times per week?: (!) No  Have you lost any weight without trying in the past 3 months?: No  Do you eat fewer than 2 meals per day?: No  Have you seen a dentist within the past year?: Yes  Body mass index is 25.35 kg/m².   Health Habits/Nutrition Interventions:  · None

## 2018-03-05 ENCOUNTER — HOSPITAL ENCOUNTER (OUTPATIENT)
Dept: ENDOSCOPY | Age: 73
Discharge: HOME | End: 2018-03-05
Attending: INTERNAL MEDICINE | Admitting: INTERNAL MEDICINE

## 2018-03-05 RX ORDER — SODIUM CHLORIDE 9 MG/ML
INJECTION, SOLUTION INTRAVENOUS CONTINUOUS
Status: CANCELLED | OUTPATIENT
Start: 2018-03-05

## 2018-03-05 RX ORDER — SODIUM CHLORIDE 0.9 % (FLUSH) 0.9 %
10 SYRINGE (ML) INJECTION EVERY 12 HOURS SCHEDULED
Status: CANCELLED | OUTPATIENT
Start: 2018-03-05

## 2018-03-05 RX ORDER — SODIUM CHLORIDE 0.9 % (FLUSH) 0.9 %
10 SYRINGE (ML) INJECTION PRN
Status: CANCELLED | OUTPATIENT
Start: 2018-03-05

## 2018-03-05 ASSESSMENT — ENCOUNTER SYMPTOMS: SHORTNESS OF BREATH: 0

## 2018-03-05 NOTE — ANESTHESIA PRE-OP
Nathalie Martin MD   ammonium lactate (AMLACTIN) 12 % cream APPLY TO AFFECTED AREA(S) ONCE A DAY AS NEEDED 12/14/17   Seferino Zaman MD   DULoxetine (CYMBALTA) 30 MG extended release capsule Take 1 capsule by mouth daily 8/24/17   Seferino Zaman MD   celecoxib (CELEBREX) 200 MG capsule Take 1 capsule by mouth daily as needed for Pain with food 8/24/17   Seferino Zaman MD   lansoprazole (PREVACID) 30 MG delayed release capsule TAKE ONE CAPSULE BY MOUTH DAILY 7/27/17   Seferino Zaman MD   valsartan (DIOVAN) 160 MG tablet TAKE ONE TABLET BY MOUTH DAILY 7/27/17   Seferino Zaman MD   traZODone (DESYREL) 50 MG tablet 1-2 tab hs 5/8/17   Seferino Zaman MD   acetaminophen (TYLENOL) 325 MG tablet Take 2 tablets by mouth every 4 hours as needed for Pain 4/13/17   GIANNA Gomez   valACYclovir (VALTREX) 500 MG tablet Take 1 tablet by mouth as needed 1/26/17   Historical Provider, MD   Cholecalciferol (VITAMIN D) 2000 UNITS CAPS capsule Take 1 capsule by mouth daily     Historical Provider, MD   calcium carbonate (OSCAL) 500 MG TABS tablet Take 500 mg by mouth daily    Historical Provider, MD       Current Outpatient Prescriptions   Medication Sig Dispense Refill    zolpidem (AMBIEN CR) 6.25 MG extended release tablet TAKE ONE TABLET BY MOUTH EVERY NIGHT AT BEDTIME AS NEEDED.  90 tablet 0    ammonium lactate (AMLACTIN) 12 % cream APPLY TO AFFECTED AREA(S) ONCE A DAY AS NEEDED 1 Tube 5    DULoxetine (CYMBALTA) 30 MG extended release capsule Take 1 capsule by mouth daily 90 capsule 1    celecoxib (CELEBREX) 200 MG capsule Take 1 capsule by mouth daily as needed for Pain with food 90 capsule 1    lansoprazole (PREVACID) 30 MG delayed release capsule TAKE ONE CAPSULE BY MOUTH DAILY 90 capsule 3    valsartan (DIOVAN) 160 MG tablet TAKE ONE TABLET BY MOUTH DAILY 90 tablet 3    traZODone (DESYREL) 50 MG tablet 1-2 tab hs 60 tablet 3    acetaminophen (TYLENOL) 325 MG tablet Take 2 tablets by mouth every 4 hours as needed for Pain 120 NPO Status  > 8 hours                       BMI  There is no height or weight on file to calculate BMI. Estimated body mass index is 25.35 kg/m² as calculated from the following:    Height as of 2/16/18: 5' 4.5\" (1.638 m). Weight as of 2/16/18: 150 lb (68 kg). Additional Testing (Echo, Stress, ECG, PFTs, etc)        Anesthesia Evaluation  Patient summary reviewed and Nursing notes reviewed no history of anesthetic complications:   Airway: Mallampati: II  TM distance: >3 FB   Neck ROM: full  Mouth opening: > = 3 FB Dental:          Pulmonary:       (-) pneumonia, COPD, asthma, shortness of breath, recent URI and sleep apnea                           Cardiovascular:  Exercise tolerance: good (>4 METS),   (+) hypertension:,     (-) pacemaker, valvular problems/murmurs, past MI, CAD, CABG/stent, dysrhythmias,  angina,  CHF, orthopnea, PND and  ROSENBAUM      Rhythm: regular  Rate: normal                    Neuro/Psych:   (+) neuromuscular disease:,    (-) seizures, TIA, CVA, headaches and psychiatric history           GI/Hepatic/Renal:   (+) hiatal hernia, GERD:,      (-) PUD, hepatitis, liver disease, no renal disease and bowel prep       Endo/Other:    (+) malignancy/cancer. (-) hypothyroidism, hyperthyroidism, blood dyscrasia, arthritis               Abdominal:         (-) obese     Vascular:                                        Anesthesia Plan      MAC     ASA 2       Induction: intravenous. Anesthetic plan and risks discussed with patient. Plan discussed with CRNA. DOS STAFF ADDENDUM:    Pt seen and examined, chart reviewed (including anesthesia, drug and allergy history). No interval changes to history and physical examination. Anesthetic plan, risks, benefits, alternatives, and personnel involved discussed with patient. Patient verbalized an understanding and agrees to proceed.       Domi Dee MD  March 5, 2018  7:57 AM

## 2018-03-23 DIAGNOSIS — B00.1 FEVER BLISTER: ICD-10-CM

## 2018-03-23 RX ORDER — VALACYCLOVIR HYDROCHLORIDE 500 MG/1
TABLET, FILM COATED ORAL
Qty: 12 TABLET | Refills: 2 | Status: SHIPPED | OUTPATIENT
Start: 2018-03-23 | End: 2019-02-01 | Stop reason: SDUPTHER

## 2018-04-16 DIAGNOSIS — Z11.59 NEED FOR HEPATITIS C SCREENING TEST: Primary | ICD-10-CM

## 2018-04-16 DIAGNOSIS — D37.4 VILLOUS ADENOMA OF COLON: ICD-10-CM

## 2018-04-16 DIAGNOSIS — I10 ESSENTIAL HYPERTENSION: ICD-10-CM

## 2018-04-16 DIAGNOSIS — Z11.59 NEED FOR HEPATITIS C SCREENING TEST: ICD-10-CM

## 2018-04-16 LAB
BASOPHILS ABSOLUTE: 0.1 K/UL (ref 0–0.2)
BASOPHILS RELATIVE PERCENT: 1 %
EOSINOPHILS ABSOLUTE: 0.1 K/UL (ref 0–0.6)
EOSINOPHILS RELATIVE PERCENT: 2.1 %
HCT VFR BLD CALC: 40 % (ref 36–48)
HEMOGLOBIN: 13.3 G/DL (ref 12–16)
LYMPHOCYTES ABSOLUTE: 2 K/UL (ref 1–5.1)
LYMPHOCYTES RELATIVE PERCENT: 40 %
MCH RBC QN AUTO: 29.3 PG (ref 26–34)
MCHC RBC AUTO-ENTMCNC: 33.3 G/DL (ref 31–36)
MCV RBC AUTO: 88 FL (ref 80–100)
MONOCYTES ABSOLUTE: 0.4 K/UL (ref 0–1.3)
MONOCYTES RELATIVE PERCENT: 8.8 %
NEUTROPHILS ABSOLUTE: 2.4 K/UL (ref 1.7–7.7)
NEUTROPHILS RELATIVE PERCENT: 48.1 %
PDW BLD-RTO: 19.5 % (ref 12.4–15.4)
PLATELET # BLD: 275 K/UL (ref 135–450)
PMV BLD AUTO: 9.2 FL (ref 5–10.5)
RBC # BLD: 4.55 M/UL (ref 4–5.2)
WBC # BLD: 5.1 K/UL (ref 4–11)

## 2018-04-17 ENCOUNTER — OFFICE VISIT (OUTPATIENT)
Dept: FAMILY MEDICINE CLINIC | Age: 73
End: 2018-04-17

## 2018-04-17 VITALS
SYSTOLIC BLOOD PRESSURE: 134 MMHG | BODY MASS INDEX: 25.59 KG/M2 | OXYGEN SATURATION: 98 % | WEIGHT: 151.4 LBS | HEART RATE: 51 BPM | DIASTOLIC BLOOD PRESSURE: 84 MMHG

## 2018-04-17 DIAGNOSIS — G47.09 OTHER INSOMNIA: ICD-10-CM

## 2018-04-17 DIAGNOSIS — R73.01 IMPAIRED FASTING BLOOD SUGAR: ICD-10-CM

## 2018-04-17 DIAGNOSIS — I10 ESSENTIAL HYPERTENSION: Primary | ICD-10-CM

## 2018-04-17 DIAGNOSIS — R41.3 MEMORY DISORDER: ICD-10-CM

## 2018-04-17 LAB
A/G RATIO: 2.3 (ref 1.1–2.2)
ALBUMIN SERPL-MCNC: 4.5 G/DL (ref 3.4–5)
ALP BLD-CCNC: 69 U/L (ref 40–129)
ALT SERPL-CCNC: 17 U/L (ref 10–40)
ANION GAP SERPL CALCULATED.3IONS-SCNC: 21 MMOL/L (ref 3–16)
AST SERPL-CCNC: 20 U/L (ref 15–37)
BILIRUB SERPL-MCNC: 0.5 MG/DL (ref 0–1)
BUN BLDV-MCNC: 17 MG/DL (ref 7–20)
CALCIUM SERPL-MCNC: 9.5 MG/DL (ref 8.3–10.6)
CEA: 2.7 NG/ML (ref 0–5)
CHLORIDE BLD-SCNC: 104 MMOL/L (ref 99–110)
CHOLESTEROL, TOTAL: 209 MG/DL (ref 0–199)
CO2: 21 MMOL/L (ref 21–32)
CREAT SERPL-MCNC: 0.6 MG/DL (ref 0.6–1.2)
GFR AFRICAN AMERICAN: >60
GFR NON-AFRICAN AMERICAN: >60
GLOBULIN: 2 G/DL
GLUCOSE BLD-MCNC: 108 MG/DL (ref 70–99)
HDLC SERPL-MCNC: 101 MG/DL (ref 40–60)
HEPATITIS C ANTIBODY INTERPRETATION: NORMAL
LDL CHOLESTEROL CALCULATED: 97 MG/DL
POTASSIUM SERPL-SCNC: 4.9 MMOL/L (ref 3.5–5.1)
SODIUM BLD-SCNC: 146 MMOL/L (ref 136–145)
TOTAL PROTEIN: 6.5 G/DL (ref 6.4–8.2)
TRIGL SERPL-MCNC: 56 MG/DL (ref 0–150)
TSH REFLEX: 1.21 UIU/ML (ref 0.27–4.2)
VLDLC SERPL CALC-MCNC: 11 MG/DL

## 2018-04-17 PROCEDURE — 99213 OFFICE O/P EST LOW 20 MIN: CPT | Performed by: FAMILY MEDICINE

## 2018-04-17 ASSESSMENT — ENCOUNTER SYMPTOMS
RESPIRATORY NEGATIVE: 1
GASTROINTESTINAL NEGATIVE: 1

## 2018-05-14 DIAGNOSIS — G89.29 CHRONIC BILATERAL LOW BACK PAIN WITHOUT SCIATICA: ICD-10-CM

## 2018-05-14 DIAGNOSIS — M54.50 CHRONIC BILATERAL LOW BACK PAIN WITHOUT SCIATICA: ICD-10-CM

## 2018-05-14 RX ORDER — CELECOXIB 200 MG/1
CAPSULE ORAL
Qty: 90 CAPSULE | Refills: 1 | Status: SHIPPED | OUTPATIENT
Start: 2018-05-14 | End: 2018-11-10 | Stop reason: SDUPTHER

## 2018-05-17 DIAGNOSIS — G47.00 INSOMNIA, UNSPECIFIED TYPE: ICD-10-CM

## 2018-05-18 RX ORDER — ZOLPIDEM TARTRATE 6.25 MG/1
TABLET, FILM COATED, EXTENDED RELEASE ORAL
Qty: 90 TABLET | Refills: 0 | Status: SHIPPED | OUTPATIENT
Start: 2018-05-18 | End: 2018-08-07 | Stop reason: SDUPTHER

## 2018-06-05 ENCOUNTER — OFFICE VISIT (OUTPATIENT)
Dept: PSYCHOLOGY | Age: 73
End: 2018-06-05

## 2018-06-05 DIAGNOSIS — F10.10 ALCOHOL ABUSE: Primary | ICD-10-CM

## 2018-06-05 PROCEDURE — 90791 PSYCH DIAGNOSTIC EVALUATION: CPT | Performed by: PSYCHOLOGIST

## 2018-06-19 ENCOUNTER — OFFICE VISIT (OUTPATIENT)
Dept: PSYCHOLOGY | Age: 73
End: 2018-06-19

## 2018-06-19 DIAGNOSIS — F10.10 ALCOHOL ABUSE: Primary | ICD-10-CM

## 2018-06-19 PROCEDURE — 90832 PSYTX W PT 30 MINUTES: CPT | Performed by: PSYCHOLOGIST

## 2018-06-19 ASSESSMENT — PATIENT HEALTH QUESTIONNAIRE - PHQ9
SUM OF ALL RESPONSES TO PHQ QUESTIONS 1-9: 1
SUM OF ALL RESPONSES TO PHQ9 QUESTIONS 1 & 2: 1
1. LITTLE INTEREST OR PLEASURE IN DOING THINGS: 0
2. FEELING DOWN, DEPRESSED OR HOPELESS: 1

## 2018-06-19 ASSESSMENT — ANXIETY QUESTIONNAIRES
1. FEELING NERVOUS, ANXIOUS, OR ON EDGE: 1-SEVERAL DAYS
6. BECOMING EASILY ANNOYED OR IRRITABLE: 0-NOT AT ALL SURE
7. FEELING AFRAID AS IF SOMETHING AWFUL MIGHT HAPPEN: 1-SEVERAL DAYS
4. TROUBLE RELAXING: 1-SEVERAL DAYS
2. NOT BEING ABLE TO STOP OR CONTROL WORRYING: 0-NOT AT ALL SURE
3. WORRYING TOO MUCH ABOUT DIFFERENT THINGS: 0-NOT AT ALL SURE
GAD7 TOTAL SCORE: 3
5. BEING SO RESTLESS THAT IT IS HARD TO SIT STILL: 0-NOT AT ALL SURE

## 2018-07-16 ENCOUNTER — TELEPHONE (OUTPATIENT)
Dept: FAMILY MEDICINE CLINIC | Age: 73
End: 2018-07-16

## 2018-07-16 NOTE — TELEPHONE ENCOUNTER
Yes, I told [facility] to offer her a new patient slot (not a warm-hand off slot) when she called to reschedule. Thank you!

## 2018-07-19 ENCOUNTER — OFFICE VISIT (OUTPATIENT)
Dept: PSYCHOLOGY | Age: 73
End: 2018-07-19

## 2018-07-19 DIAGNOSIS — F10.10 ALCOHOL ABUSE: Primary | ICD-10-CM

## 2018-07-19 PROCEDURE — 90832 PSYTX W PT 30 MINUTES: CPT | Performed by: PSYCHOLOGIST

## 2018-07-19 NOTE — PATIENT INSTRUCTIONS
1. Continue with not drinking at all at home  2. Keep the social drinking to 1-2 glasses of wine or 1 beer  3. Be sure to keep your awareness and intention on your goals  4. This weekend, do not take any wine  5. [place] with pausing when you feel tempted to join others who are drinking a lot by coming up with 3 reasons why you should drink and 3 reasons why you shouldn't  6.  Return in 1 month

## 2018-07-19 NOTE — PROGRESS NOTES
= Minimal depression, 5-9 = Mild depression, 10-14 = Moderate depression, 15-19 = Moderately severe depression, 20-27 = Severe depression    How often pt has had thoughts of death or hurting self (if PHQ positive for depression):         JAVIER 7 SCORE 6/19/2018   JAVIER-7 Total Score 3     Interpretation of JAVIER-7 score: 5-9 = mild anxiety, 10-14 = moderate anxiety, 15+ = severe anxiety. Recommend referral to behavioral health for scores 10 or greater. Diagnosis:    1.  Alcohol abuse          Diagnosis Date    Esophagus, Khanna's     Essential hypertension 11/28/2016    GERD (gastroesophageal reflux disease)     HH (hiatus hernia) 07/25/11    small    IBS (irritable bowel syndrome)     Insomnia     Memory disorder 11/28/2016    TMJ (temporomandibular joint disorder)          Plan:  Pt interventions:  Trained in strategies for increasing balanced thinking, Discussed and set plan for behavioral activation, Provided education, Discussed self-care (sleep, nutrition, rewarding activities, social support, exercise), Discussed and problem-solved barriers in adhering to behavioral change plan, Motivational Interviewing to increase patient confidence and compliance with adhering to behavioral change plan and Motivational Interviewing to determine importance and readiness for change      Pt Behavioral Change Plan:  See Pt Instructions

## 2018-07-26 ENCOUNTER — TELEPHONE (OUTPATIENT)
Dept: FAMILY MEDICINE CLINIC | Age: 73
End: 2018-07-26

## 2018-07-26 DIAGNOSIS — J01.90 ACUTE SINUSITIS, RECURRENCE NOT SPECIFIED, UNSPECIFIED LOCATION: Primary | ICD-10-CM

## 2018-07-26 RX ORDER — AZELASTINE HYDROCHLORIDE, FLUTICASONE PROPIONATE 137; 50 UG/1; UG/1
1 SPRAY, METERED NASAL DAILY
Qty: 1 BOTTLE | Refills: 5 | Status: SHIPPED | OUTPATIENT
Start: 2018-07-26 | End: 2018-09-17

## 2018-07-26 NOTE — TELEPHONE ENCOUNTER
Medication and Quantity requested:  [address]    Last Visit 04/17/2018    Pharmacy and phone number updated in EPIC:  yes

## 2018-07-26 NOTE — TELEPHONE ENCOUNTER
Patient understands their PCP is out of the office and asked that this message be addressed by a provider who is in the office or on call. Patient has had ongoing sinus pressure. She tried Azelastine nasal spray (from a friend) and said it really gave her relief. She would like a Rx called into [facility].

## 2018-07-27 ENCOUNTER — TELEPHONE (OUTPATIENT)
Dept: FAMILY MEDICINE CLINIC | Age: 73
End: 2018-07-27

## 2018-07-27 DIAGNOSIS — I10 ESSENTIAL HYPERTENSION: ICD-10-CM

## 2018-07-30 DIAGNOSIS — K22.719 BARRETT'S ESOPHAGUS WITH DYSPLASIA: ICD-10-CM

## 2018-07-30 RX ORDER — VALSARTAN 160 MG/1
TABLET ORAL
Qty: 90 TABLET | Refills: 3 | Status: SHIPPED | OUTPATIENT
Start: 2018-07-30 | End: 2018-09-17

## 2018-07-30 RX ORDER — LANSOPRAZOLE 30 MG/1
CAPSULE, DELAYED RELEASE ORAL
Qty: 90 CAPSULE | Refills: 3 | Status: ON HOLD | OUTPATIENT
Start: 2018-07-30 | End: 2018-11-30 | Stop reason: HOSPADM

## 2018-07-30 NOTE — TELEPHONE ENCOUNTER
Last OV: 04/17/2018  Last refill: 07/27/2017, #90, 3 refills.      Lab Results   Component Value Date     (H) 04/16/2018    K 4.9 04/16/2018     04/16/2018    CO2 21 04/16/2018    BUN 17 04/16/2018    CREATININE 0.6 04/16/2018    GLUCOSE 108 (H) 04/16/2018    CALCIUM 9.5 04/16/2018    PROT 6.5 04/16/2018    LABALBU 4.5 04/16/2018    BILITOT 0.5 04/16/2018    ALKPHOS 69 04/16/2018    AST 20 04/16/2018    ALT 17 04/16/2018    LABGLOM >60 04/16/2018    GFRAA >60 04/16/2018    AGRATIO 2.3 (H) 04/16/2018    GLOB 2.0 04/16/2018

## 2018-08-01 ENCOUNTER — TELEPHONE (OUTPATIENT)
Dept: FAMILY MEDICINE CLINIC | Age: 73
End: 2018-08-01

## 2018-08-01 NOTE — TELEPHONE ENCOUNTER
Office has been notified that pt is requiring Prior Authorization for the following medication:    Dymista Nasal Spray    Please initiate this request through CoverMyMeds, contacting the following Payor/Insurance:    [employer]    Please see below, or the documentation attached to this encounter for any additional information that may assist in processing PA:    Please provide ICD- 10 code. Than route to PA Team for authorization. Thanks      Thank you!

## 2018-08-06 ENCOUNTER — TELEPHONE (OUTPATIENT)
Dept: FAMILY MEDICINE CLINIC | Age: 73
End: 2018-08-06

## 2018-08-06 DIAGNOSIS — G47.00 INSOMNIA, UNSPECIFIED TYPE: ICD-10-CM

## 2018-08-07 RX ORDER — ZOLPIDEM TARTRATE 6.25 MG/1
TABLET, FILM COATED, EXTENDED RELEASE ORAL
Qty: 90 TABLET | Refills: 0 | Status: SHIPPED | OUTPATIENT
Start: 2018-08-07 | End: 2018-11-12 | Stop reason: SDUPTHER

## 2018-09-17 ENCOUNTER — OFFICE VISIT (OUTPATIENT)
Dept: FAMILY MEDICINE CLINIC | Age: 73
End: 2018-09-17

## 2018-09-17 VITALS
HEART RATE: 61 BPM | WEIGHT: 150.4 LBS | OXYGEN SATURATION: 96 % | BODY MASS INDEX: 25.42 KG/M2 | SYSTOLIC BLOOD PRESSURE: 140 MMHG | DIASTOLIC BLOOD PRESSURE: 92 MMHG

## 2018-09-17 DIAGNOSIS — I10 ESSENTIAL HYPERTENSION: Primary | ICD-10-CM

## 2018-09-17 DIAGNOSIS — L65.9 HAIR LOSS: ICD-10-CM

## 2018-09-17 LAB
A/G RATIO: 2.2 (ref 1.1–2.2)
ALBUMIN SERPL-MCNC: 4.3 G/DL (ref 3.4–5)
ALP BLD-CCNC: 72 U/L (ref 40–129)
ALT SERPL-CCNC: 11 U/L (ref 10–40)
ANION GAP SERPL CALCULATED.3IONS-SCNC: 15 MMOL/L (ref 3–16)
AST SERPL-CCNC: 17 U/L (ref 15–37)
BASOPHILS ABSOLUTE: 0.1 K/UL (ref 0–0.2)
BASOPHILS RELATIVE PERCENT: 1.3 %
BILIRUB SERPL-MCNC: 0.6 MG/DL (ref 0–1)
BUN BLDV-MCNC: 19 MG/DL (ref 7–20)
CALCIUM SERPL-MCNC: 9.4 MG/DL (ref 8.3–10.6)
CHLORIDE BLD-SCNC: 104 MMOL/L (ref 99–110)
CO2: 23 MMOL/L (ref 21–32)
CREAT SERPL-MCNC: 0.6 MG/DL (ref 0.6–1.2)
EOSINOPHILS ABSOLUTE: 0.1 K/UL (ref 0–0.6)
EOSINOPHILS RELATIVE PERCENT: 2.3 %
GFR AFRICAN AMERICAN: >60
GFR NON-AFRICAN AMERICAN: >60
GLOBULIN: 2 G/DL
GLUCOSE FASTING: 91 MG/DL (ref 70–99)
HCT VFR BLD CALC: 41.4 % (ref 36–48)
HEMOGLOBIN: 13.5 G/DL (ref 12–16)
LYMPHOCYTES ABSOLUTE: 1.6 K/UL (ref 1–5.1)
LYMPHOCYTES RELATIVE PERCENT: 33.1 %
MCH RBC QN AUTO: 30.8 PG (ref 26–34)
MCHC RBC AUTO-ENTMCNC: 32.7 G/DL (ref 31–36)
MCV RBC AUTO: 94.1 FL (ref 80–100)
MONOCYTES ABSOLUTE: 0.5 K/UL (ref 0–1.3)
MONOCYTES RELATIVE PERCENT: 9.7 %
NEUTROPHILS ABSOLUTE: 2.6 K/UL (ref 1.7–7.7)
NEUTROPHILS RELATIVE PERCENT: 53.6 %
PDW BLD-RTO: 14.8 % (ref 12.4–15.4)
PLATELET # BLD: 236 K/UL (ref 135–450)
PMV BLD AUTO: 9 FL (ref 5–10.5)
POTASSIUM SERPL-SCNC: 4.5 MMOL/L (ref 3.5–5.1)
RBC # BLD: 4.4 M/UL (ref 4–5.2)
SODIUM BLD-SCNC: 142 MMOL/L (ref 136–145)
TOTAL PROTEIN: 6.3 G/DL (ref 6.4–8.2)
TSH REFLEX FT4: 0.36 UIU/ML (ref 0.27–4.2)
WBC # BLD: 4.8 K/UL (ref 4–11)

## 2018-09-17 PROCEDURE — 99213 OFFICE O/P EST LOW 20 MIN: CPT | Performed by: NURSE PRACTITIONER

## 2018-09-17 PROCEDURE — 90662 IIV NO PRSV INCREASED AG IM: CPT | Performed by: NURSE PRACTITIONER

## 2018-09-17 PROCEDURE — G0008 ADMIN INFLUENZA VIRUS VAC: HCPCS | Performed by: NURSE PRACTITIONER

## 2018-09-17 RX ORDER — LISINOPRIL 30 MG/1
30 TABLET ORAL DAILY
Qty: 30 TABLET | Refills: 3 | Status: SHIPPED | OUTPATIENT
Start: 2018-09-17 | End: 2018-09-29 | Stop reason: SDUPTHER

## 2018-09-17 ASSESSMENT — ENCOUNTER SYMPTOMS
SHORTNESS OF BREATH: 0
ORTHOPNEA: 0

## 2018-09-17 NOTE — PATIENT INSTRUCTIONS
Patient Education        High Blood Pressure: Care Instructions  Your Care Instructions    If your blood pressure is usually above 130/80, you have high blood pressure, or hypertension. That means the top number is 130 or higher or the bottom number is 80 or higher, or both. Despite what a lot of people think, high blood pressure usually doesn't cause headaches or make you feel dizzy or lightheaded. It usually has no symptoms. But it does increase your risk for heart attack, stroke, and kidney or eye damage. The higher your blood pressure, the more your risk increases. Your doctor will give you a goal for your blood pressure. Your goal will be based on your health and your age. Lifestyle changes, such as eating healthy and being active, are always important to help lower blood pressure. You might also take medicine to reach your blood pressure goal.  Follow-up care is a key part of your treatment and safety. Be sure to make and go to all appointments, and call your doctor if you are having problems. It's also a good idea to know your test results and keep a list of the medicines you take. How can you care for yourself at home? Medical treatment  · If you stop taking your medicine, your blood pressure will go back up. You may take one or more types of medicine to lower your blood pressure. Be safe with medicines. Take your medicine exactly as prescribed. Call your doctor if you think you are having a problem with your medicine. · Talk to your doctor before you start taking aspirin every day. Aspirin can help certain people lower their risk of a heart attack or stroke. But taking aspirin isn't right for everyone, because it can cause serious bleeding. · See your doctor regularly. You may need to see the doctor more often at first or until your blood pressure comes down.   · If you are taking blood pressure medicine, talk to your doctor before you take decongestants or anti-inflammatory medicine, such as ibuprofen. Some of these medicines can raise blood pressure. · Learn how to check your blood pressure at home. Lifestyle changes  · Stay at a healthy weight. This is especially important if you put on weight around the waist. Losing even 10 pounds can help you lower your blood pressure. · If your doctor recommends it, get more exercise. Walking is a good choice. Bit by bit, increase the amount you walk every day. Try for at least 30 minutes on most days of the week. You also may want to swim, bike, or do other activities. · Avoid or limit alcohol. Talk to your doctor about whether you can drink any alcohol. · Try to limit how much sodium you eat to less than 2,300 milligrams (mg) a day. Your doctor may ask you to try to eat less than 1,500 mg a day. · Eat plenty of fruits (such as bananas and oranges), vegetables, legumes, whole grains, and low-fat dairy products. · Lower the amount of saturated fat in your diet. Saturated fat is found in animal products such as milk, cheese, and meat. Limiting these foods may help you lose weight and also lower your risk for heart disease. · Do not smoke. Smoking increases your risk for heart attack and stroke. If you need help quitting, talk to your doctor about stop-smoking programs and medicines. These can increase your chances of quitting for good. When should you call for help? Call 911 anytime you think you may need emergency care. This may mean having symptoms that suggest that your blood pressure is causing a serious heart or blood vessel problem. Your blood pressure may be over 180/110.   For example, call 911 if:    · You have symptoms of a heart attack. These may include:  ¨ Chest pain or pressure, or a strange feeling in the chest.  ¨ Sweating. ¨ Shortness of breath. ¨ Nausea or vomiting. ¨ Pain, pressure, or a strange feeling in the back, neck, jaw, or upper belly or in one or both shoulders or arms. ¨ Lightheadedness or sudden weakness.   ¨ A fast or irregular heartbeat.     · You have symptoms of a stroke. These may include:  ¨ Sudden numbness, tingling, weakness, or loss of movement in your face, arm, or leg, especially on only one side of your body. ¨ Sudden vision changes. ¨ Sudden trouble speaking. ¨ Sudden confusion or trouble understanding simple statements. ¨ Sudden problems with walking or balance. ¨ A sudden, severe headache that is different from past headaches.     · You have severe back or belly pain.    Do not wait until your blood pressure comes down on its own. Get help right away.   Call your doctor now or seek immediate care if:    · Your blood pressure is much higher than normal (such as 180/110 or higher), but you don't have symptoms.     · You think high blood pressure is causing symptoms, such as:  ¨ Severe headache. ¨ Blurry vision.    Watch closely for changes in your health, and be sure to contact your doctor if:    · Your blood pressure measures 140/90 or higher at least 2 times. That means the top number is 140 or higher or the bottom number is 90 or higher, or both.     · You think you may be having side effects from your blood pressure medicine.     · Your blood pressure is usually normal, but it goes above normal at least 2 times. Where can you learn more? Go to [email or web address] and sign in to your [employer] account. Enter A908 in the beenz.com box to learn more about \"High Blood Pressure: Care Instructions. \"     If you do not have an account, please click on the \"Sign Up Now\" link. Current as of: December 6, 2017  Content Version: 11.7  © 4682-0940 [employer]. Care instructions adapted under license by [facility] ([facility]). If you have questions about a medical condition or this instruction, always ask your healthcare professional. [place] [address] any warranty or liability for your use of this information.

## 2018-09-25 RX ORDER — SCOLOPAMINE TRANSDERMAL SYSTEM 1 MG/1
1 PATCH, EXTENDED RELEASE TRANSDERMAL
Qty: 3 PATCH | Refills: 0 | Status: SHIPPED | OUTPATIENT
Start: 2018-09-25 | End: 2018-10-03

## 2018-09-28 DIAGNOSIS — I10 ESSENTIAL HYPERTENSION: ICD-10-CM

## 2018-09-28 NOTE — TELEPHONE ENCOUNTER
Medication and Quantity requested: lisinopril (ZESTRIL) 30 MG tablet          Last Visit  9/17/18    Pharmacy and phone number updated in EPIC:  yes

## 2018-09-29 RX ORDER — LISINOPRIL 30 MG/1
30 TABLET ORAL DAILY
Qty: 90 TABLET | Refills: 3 | Status: SHIPPED | OUTPATIENT
Start: 2018-09-29 | End: 2019-07-09 | Stop reason: SDUPTHER

## 2018-09-29 NOTE — TELEPHONE ENCOUNTER
Last refill: 09/17/2018, #30, 3 refills.      Lab Results   Component Value Date     09/17/2018    K 4.5 09/17/2018     09/17/2018    CO2 23 09/17/2018    BUN 19 09/17/2018    CREATININE 0.6 09/17/2018    GLUCOSE 108 (H) 04/16/2018    CALCIUM 9.4 09/17/2018    PROT 6.3 (L) 09/17/2018    LABALBU 4.3 09/17/2018    BILITOT 0.6 09/17/2018    ALKPHOS 72 09/17/2018    AST 17 09/17/2018    ALT 11 09/17/2018    LABGLOM >60 09/17/2018    GFRAA >60 09/17/2018    AGRATIO 2.2 09/17/2018    GLOB 2.0 09/17/2018

## 2018-11-02 ENCOUNTER — PATIENT MESSAGE (OUTPATIENT)
Dept: FAMILY MEDICINE CLINIC | Age: 73
End: 2018-11-02

## 2018-11-02 RX ORDER — FAMOTIDINE 40 MG/1
40 TABLET, FILM COATED ORAL PRN
Qty: 30 TABLET | Refills: 5 | Status: ON HOLD | OUTPATIENT
Start: 2018-11-02 | End: 2018-11-30

## 2018-11-10 DIAGNOSIS — G89.29 CHRONIC BILATERAL LOW BACK PAIN WITHOUT SCIATICA: ICD-10-CM

## 2018-11-10 DIAGNOSIS — M54.50 CHRONIC BILATERAL LOW BACK PAIN WITHOUT SCIATICA: ICD-10-CM

## 2018-11-12 DIAGNOSIS — G47.00 INSOMNIA, UNSPECIFIED TYPE: ICD-10-CM

## 2018-11-12 RX ORDER — CELECOXIB 200 MG/1
CAPSULE ORAL
Qty: 90 CAPSULE | Refills: 1 | Status: SHIPPED | OUTPATIENT
Start: 2018-11-12 | End: 2019-05-20 | Stop reason: SDUPTHER

## 2018-11-12 RX ORDER — ZOLPIDEM TARTRATE 6.25 MG/1
TABLET, FILM COATED, EXTENDED RELEASE ORAL
Qty: 90 TABLET | Refills: 0 | Status: SHIPPED | OUTPATIENT
Start: 2018-11-12 | End: 2019-01-24 | Stop reason: SDUPTHER

## 2018-11-13 NOTE — PROGRESS NOTES
piercing jewelry must be removed. 11. If you have ___dentures, they will be removed before going to the OR; we will provide you a container. If you wear ___contact lenses or ___glasses, they will be removed; please bring a case for them. 12. Please see your family doctor/pediatrician for a history & physical and/or concerning medications. Bring any test results/reports from your physician's office. PCP__________________Phone___________H&P Appt. Date________             13 If you  have a Living Will and Durable Power of  for Healthcare, please bring in a copy. 15. Notify your Surgeon if you develop any illness between now and surgery  time, cough, cold, fever, sore throat, nausea, vomiting, etc.  Please notify your surgeon if you experience dizziness, shortness of breath or blurred vision between now & the time of your surgery             15. DO NOT shave your operative site 96 hours prior to surgery. For face & neck surgery, men may use an electric razor 48 hours prior to surgery. 16. Shower the night before surgery with ___Antibacterial soap ___Hibiclens             17. To provide excellent care visitors will be limited to one in the room at any given time. 18.  Please bring picture ID and insurance card. 19.  Visit our web site for additional information:  [email or web address]              20.During flu season no children under the age of 15 are permitted in the hospital for the safety of all patients. 21. If you take a long acting insulin in the evening only  take half of your usual  dose the night  before your procedure              22. If you use a c-pap please bring DOS if staying overnight,             23.For your convenience [facility] has a pharmacy on site to fill your prescriptions.              24. If you use oxygen and have a portable tank please bring it  with you the DOS             25.

## 2018-11-14 ENCOUNTER — ANESTHESIA EVENT (OUTPATIENT)
Dept: ENDOSCOPY | Age: 73
End: 2018-11-14
Payer: MEDICARE

## 2018-11-16 ENCOUNTER — TELEPHONE (OUTPATIENT)
Dept: FAMILY MEDICINE CLINIC | Age: 73
End: 2018-11-16

## 2018-11-16 NOTE — TELEPHONE ENCOUNTER
Evgeny with [facility] calling regarding pt's zolpidem, states pt needs to try an alternate medication can use trazadone or rozerem.  Please advise as to which alternate you want pt to use and send script to [facility]

## 2018-11-30 ENCOUNTER — HOSPITAL ENCOUNTER (OUTPATIENT)
Age: 73
Setting detail: OUTPATIENT SURGERY
Discharge: HOME | End: 2018-11-30
Attending: INTERNAL MEDICINE | Admitting: INTERNAL MEDICINE
Payer: MEDICARE

## 2018-11-30 ENCOUNTER — ANESTHESIA (OUTPATIENT)
Dept: ENDOSCOPY | Age: 73
End: 2018-11-30
Payer: MEDICARE

## 2018-11-30 VITALS
TEMPERATURE: 97.7 F | HEIGHT: 65 IN | DIASTOLIC BLOOD PRESSURE: 73 MMHG | WEIGHT: 146 LBS | RESPIRATION RATE: 16 BRPM | OXYGEN SATURATION: 100 % | SYSTOLIC BLOOD PRESSURE: 128 MMHG | HEART RATE: 53 BPM | BODY MASS INDEX: 24.32 KG/M2

## 2018-11-30 VITALS — DIASTOLIC BLOOD PRESSURE: 85 MMHG | OXYGEN SATURATION: 97 % | SYSTOLIC BLOOD PRESSURE: 136 MMHG

## 2018-11-30 PROCEDURE — 2580000003 HC RX 258: Performed by: ANESTHESIOLOGY

## 2018-11-30 PROCEDURE — 2500000003 HC RX 250 WO HCPCS: Performed by: NURSE ANESTHETIST, CERTIFIED REGISTERED

## 2018-11-30 PROCEDURE — 3700000001 HC ADD 15 MINUTES (ANESTHESIA): Performed by: INTERNAL MEDICINE

## 2018-11-30 PROCEDURE — 6360000002 HC RX W HCPCS: Performed by: NURSE ANESTHETIST, CERTIFIED REGISTERED

## 2018-11-30 PROCEDURE — 3700000000 HC ANESTHESIA ATTENDED CARE: Performed by: INTERNAL MEDICINE

## 2018-11-30 PROCEDURE — 7100000011 HC PHASE II RECOVERY - ADDTL 15 MIN: Performed by: INTERNAL MEDICINE

## 2018-11-30 PROCEDURE — C1726 CATH, BAL DIL, NON-VASCULAR: HCPCS | Performed by: INTERNAL MEDICINE

## 2018-11-30 PROCEDURE — 3609012500 HC EGD DILATION BALLOON: Performed by: INTERNAL MEDICINE

## 2018-11-30 PROCEDURE — 2709999900 HC NON-CHARGEABLE SUPPLY: Performed by: INTERNAL MEDICINE

## 2018-11-30 PROCEDURE — 7100000010 HC PHASE II RECOVERY - FIRST 15 MIN: Performed by: INTERNAL MEDICINE

## 2018-11-30 RX ORDER — PROPOFOL 10 MG/ML
INJECTION, EMULSION INTRAVENOUS PRN
Status: DISCONTINUED | OUTPATIENT
Start: 2018-11-30 | End: 2018-11-30 | Stop reason: SDUPTHER

## 2018-11-30 RX ORDER — LIDOCAINE HYDROCHLORIDE 20 MG/ML
INJECTION, SOLUTION INFILTRATION; PERINEURAL PRN
Status: DISCONTINUED | OUTPATIENT
Start: 2018-11-30 | End: 2018-11-30 | Stop reason: SDUPTHER

## 2018-11-30 RX ORDER — SODIUM CHLORIDE 9 MG/ML
INJECTION, SOLUTION INTRAVENOUS CONTINUOUS
Status: DISCONTINUED | OUTPATIENT
Start: 2018-11-30 | End: 2018-11-30 | Stop reason: HOSPADM

## 2018-11-30 RX ORDER — FAMOTIDINE 40 MG/1
40 TABLET, FILM COATED ORAL NIGHTLY
Qty: 30 TABLET | Refills: 5 | Status: SHIPPED | OUTPATIENT
Start: 2018-11-30 | End: 2020-08-18

## 2018-11-30 RX ORDER — LIDOCAINE HYDROCHLORIDE 10 MG/ML
1 INJECTION, SOLUTION EPIDURAL; INFILTRATION; INTRACAUDAL; PERINEURAL
Status: DISCONTINUED | OUTPATIENT
Start: 2018-11-30 | End: 2018-11-30 | Stop reason: HOSPADM

## 2018-11-30 RX ORDER — PANTOPRAZOLE SODIUM 40 MG/1
40 TABLET, DELAYED RELEASE ORAL
Qty: 30 TABLET | Refills: 5 | Status: SHIPPED | OUTPATIENT
Start: 2018-11-30 | End: 2020-12-08 | Stop reason: SDUPTHER

## 2018-11-30 RX ORDER — SODIUM CHLORIDE 0.9 % (FLUSH) 0.9 %
10 SYRINGE (ML) INJECTION EVERY 12 HOURS SCHEDULED
Status: DISCONTINUED | OUTPATIENT
Start: 2018-11-30 | End: 2018-11-30 | Stop reason: HOSPADM

## 2018-11-30 RX ORDER — SODIUM CHLORIDE 0.9 % (FLUSH) 0.9 %
10 SYRINGE (ML) INJECTION PRN
Status: DISCONTINUED | OUTPATIENT
Start: 2018-11-30 | End: 2018-11-30 | Stop reason: HOSPADM

## 2018-11-30 RX ADMIN — LIDOCAINE HYDROCHLORIDE 100 MG: 20 INJECTION, SOLUTION INFILTRATION; PERINEURAL at 09:19

## 2018-11-30 RX ADMIN — PROPOFOL 50 MG: 10 INJECTION, EMULSION INTRAVENOUS at 09:21

## 2018-11-30 RX ADMIN — PROPOFOL 50 MG: 10 INJECTION, EMULSION INTRAVENOUS at 09:22

## 2018-11-30 RX ADMIN — SODIUM CHLORIDE: 9 INJECTION, SOLUTION INTRAVENOUS at 09:01

## 2018-11-30 RX ADMIN — SODIUM CHLORIDE: 9 INJECTION, SOLUTION INTRAVENOUS at 09:16

## 2018-11-30 RX ADMIN — PROPOFOL 50 MG: 10 INJECTION, EMULSION INTRAVENOUS at 09:20

## 2018-11-30 ASSESSMENT — PAIN - FUNCTIONAL ASSESSMENT: PAIN_FUNCTIONAL_ASSESSMENT: 0-10

## 2018-11-30 ASSESSMENT — PAIN SCALES - GENERAL: PAINLEVEL_OUTOF10: 0

## 2018-11-30 NOTE — ANESTHESIA PRE PROCEDURE
Department of Anesthesiology  Preprocedure Note       Name:  Richi Dhillon   Age:  72 y.o.  :  1945                                          MRN:  [id number]         Date:  2018      Surgeon: Arnulfo Calle):  Ana Castaneda MD    Procedure: EGD (N/A )    Medications prior to admission:   Prior to Admission medications    Medication Sig Start Date End Date Taking?  Authorizing Provider   celecoxib (CELEBREX) 200 MG capsule TAKE 1 CAPSULE DAILY WITH FOOD AS NEEDED FOR PAIN 18  Yes Adurey Sosa MD   zolpidem (AMBIEN CR) 6.25 MG extended release tablet TAKE ONE TABLET BY MOUTH EVERY NIGHT AT BEDTIME AS NEEDED. 18 Yes Audrey Sosa MD   famotidine (PEPCID) 40 MG tablet Take 1 tablet by mouth as needed (for heartburn) 18  Yes Audrey Sosa MD   lisinopril (ZESTRIL) 30 MG tablet Take 1 tablet by mouth daily 18 Yes Audrey Sosa MD   DULoxetine (CYMBALTA) 30 MG extended release capsule TAKE 1 CAPSULE DAILY 18  Yes Audrey Sosa MD   lansoprazole (PREVACID) 30 MG delayed release capsule TAKE 1 CAPSULE DAILY 18  Yes Audrey Sosa MD   valACYclovir (VALTREX) 500 MG tablet TAKE TWO TABLETS BY MOUTH NOW, THEN TAKE TWO TABLET BY MOUTH IN 12 HOURS 3/23/18  Yes Audrey Sosa MD   Cholecalciferol (VITAMIN D) 2000 UNITS CAPS capsule Take 1 capsule by mouth daily    Yes Historical Provider, MD   ammonium lactate (AMLACTIN) 12 % cream APPLY TO AFFECTED AREA(S) ONCE A DAY AS NEEDED 17   Audrey Sosa MD       Current medications:    Current Facility-Administered Medications   Medication Dose Route Frequency Provider Last Rate Last Dose    0.9 % sodium chloride infusion   Intravenous Continuous Rayo Mars MD        sodium chloride flush 0.9 % injection 10 mL  10 mL Intravenous 2 times per day Rikki Yuen MD        sodium chloride flush 0.9 % injection 10 mL  10 mL Intravenous PRN Rikki Yuen MD        lidocaine PF 1 % injection 1 mL  1 mL Intradermal Once PRN Ki Mendez MD           Allergies:  No Known Allergies    Problem List:    Patient Active Problem List   Diagnosis Code    TMJ (temporomandibular joint disorder) M26.609    Insomnia G47.00    Irritable bowel syndrome without diarrhea K58.9    Khanna's esophagus with dysplasia K22.719    Bilateral low back pain without sciatica M54.5    Abnormal EKG R94.31    Memory disorder R41.3    Essential hypertension I10    Villous adenoma of colon D37.4    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 Eloy Tijerina COLONOSCOPY  2000,2003,2006,11,3/5/18    5 years: moderate pan colonic diverticulosis, transverse colon anastomosis, Dr Tony Rogers Right 11/2017    SINUS SURGERY  2009    TONSILLECTOMY      TOTAL HIP ARTHROPLASTY Left     2016    UPPER GASTROINTESTINAL ENDOSCOPY  2004,2006    UPPER GASTROINTESTINAL ENDOSCOPY  07/25/11    with biopsy, and with esophageal balloon dilation       Social History:    Social History   Substance Use Topics    Smoking status: Former Smoker     Packs/day: 0.50     Years: 15.00     Types: Cigarettes     Quit date: 9/4/2002    Smokeless tobacco: Never Used    Alcohol use 3.6 oz/week     6 Glasses of wine per week                                Counseling given: Not Answered      Vital Signs (Current):   Vitals:    11/13/18 1201   Weight: 146 lb (66.2 kg)   Height: 5' 5\" (1.651 m)                                              BP Readings from Last 3 Encounters: Neuro/Psych:   Negative Neuro/Psych ROS              GI/Hepatic/Renal:   (+) GERD:,           Endo/Other:    (+) malignancy/cancer. Abdominal:           Vascular: negative vascular ROS. Anesthesia Plan      MAC     ASA 3       Induction: intravenous. Anesthetic plan and risks discussed with patient. Plan discussed with CRNA.                   Adrián Pete MD   11/30/2018

## 2018-11-30 NOTE — PROGRESS NOTES
name: N/A    Number of children: 2    Years of education: N/A     Occupational History    Not on file.      Social History Main Topics    Smoking status: Former Smoker     Packs/day: 0.50     Years: 15.00     Types: Cigarettes     Quit date: 9/4/2002    Smokeless tobacco: Never Used    Alcohol use 3.6 oz/week     6 Glasses of wine per week    Drug use: No    Sexual activity: Not on file     Other Topics Concern    Not on file     Social History Narrative    No narrative on file       Family History:  Family History   Problem Relation Age of Onset    Colon Cancer Mother     Other Father         diverticulitis    Hypertension Father

## 2018-11-30 NOTE — ANESTHESIA POSTPROCEDURE EVALUATION
Department of Anesthesiology  Postprocedure Note    Patient: Adan Mccain  MRN: [id number]  YOB: 1945  Date of evaluation: 11/30/2018  Time:  2:44 PM     Procedure Summary     Date:  11/30/18 Room / Location:  [facility] ENDO 03 / [facility] ENDOSCOPY    Anesthesia Start:  2227 Anesthesia Stop:  5571    Procedure:  EGD DILATION BALLOON (N/A ) Diagnosis:  (GERD K21.9)    Surgeon:  Gila Ann MD Responsible Provider:  Christine Francis MD    Anesthesia Type:  MAC ASA Status:  3          Anesthesia Type: MAC    Maribell Phase I: Maribell Score: 10    Maribell Phase II: Maribell Score: 10    Last vitals: Reviewed and per EMR flowsheets.        Anesthesia Post Evaluation    Patient location during evaluation: PACU  Complications: no  Cardiovascular status: hemodynamically stable  Respiratory status: acceptable

## 2018-12-27 ENCOUNTER — HOSPITAL ENCOUNTER (OUTPATIENT)
Dept: WOMENS IMAGING | Age: 73
Discharge: HOME | End: 2018-12-27
Payer: MEDICARE

## 2018-12-27 DIAGNOSIS — Z12.39 BREAST CANCER SCREENING: ICD-10-CM

## 2018-12-27 PROCEDURE — 77063 BREAST TOMOSYNTHESIS BI: CPT

## 2019-01-24 DIAGNOSIS — G47.00 INSOMNIA, UNSPECIFIED TYPE: ICD-10-CM

## 2019-01-24 RX ORDER — ZOLPIDEM TARTRATE 6.25 MG/1
TABLET, FILM COATED, EXTENDED RELEASE ORAL
Qty: 90 TABLET | Refills: 0 | Status: SHIPPED | OUTPATIENT
Start: 2019-01-24 | End: 2019-04-30 | Stop reason: SDUPTHER

## 2019-01-28 ENCOUNTER — OFFICE VISIT (OUTPATIENT)
Dept: FAMILY MEDICINE CLINIC | Age: 74
End: 2019-01-28
Payer: MEDICARE

## 2019-01-28 VITALS
HEIGHT: 65 IN | BODY MASS INDEX: 25.39 KG/M2 | WEIGHT: 152.4 LBS | HEART RATE: 54 BPM | TEMPERATURE: 97.9 F | DIASTOLIC BLOOD PRESSURE: 80 MMHG | OXYGEN SATURATION: 98 % | SYSTOLIC BLOOD PRESSURE: 116 MMHG | RESPIRATION RATE: 14 BRPM

## 2019-01-28 DIAGNOSIS — Z01.818 PREOP EXAMINATION: Primary | ICD-10-CM

## 2019-01-28 DIAGNOSIS — C18.4 MALIGNANT NEOPLASM OF TRANSVERSE COLON (HCC): ICD-10-CM

## 2019-01-28 DIAGNOSIS — I10 ESSENTIAL HYPERTENSION: ICD-10-CM

## 2019-01-28 DIAGNOSIS — M26.609 TMJ (TEMPOROMANDIBULAR JOINT DISORDER): ICD-10-CM

## 2019-01-28 DIAGNOSIS — G47.09 OTHER INSOMNIA: ICD-10-CM

## 2019-01-28 DIAGNOSIS — K22.719 BARRETT'S ESOPHAGUS WITH DYSPLASIA: ICD-10-CM

## 2019-01-28 PROCEDURE — 99214 OFFICE O/P EST MOD 30 MIN: CPT | Performed by: NURSE PRACTITIONER

## 2019-02-01 DIAGNOSIS — B00.1 FEVER BLISTER: ICD-10-CM

## 2019-02-01 RX ORDER — VALACYCLOVIR HYDROCHLORIDE 500 MG/1
TABLET, FILM COATED ORAL
Qty: 12 TABLET | Refills: 2 | Status: SHIPPED | OUTPATIENT
Start: 2019-02-01 | End: 2020-11-30 | Stop reason: SDUPTHER

## 2019-02-05 ENCOUNTER — TELEPHONE (OUTPATIENT)
Dept: FAMILY MEDICINE CLINIC | Age: 74
End: 2019-02-05

## 2019-02-05 RX ORDER — NITROFURANTOIN 25; 75 MG/1; MG/1
100 CAPSULE ORAL 2 TIMES DAILY
Qty: 14 CAPSULE | Refills: 0 | Status: SHIPPED | OUTPATIENT
Start: 2019-02-05 | End: 2019-02-12

## 2019-04-09 RX ORDER — AMMONIUM LACTATE 12 G/100G
CREAM TOPICAL
Qty: 1 TUBE | Refills: 4 | Status: SHIPPED | OUTPATIENT
Start: 2019-04-09 | End: 2020-12-08

## 2019-04-22 ENCOUNTER — OFFICE VISIT (OUTPATIENT)
Dept: FAMILY MEDICINE CLINIC | Age: 74
End: 2019-04-22
Payer: MEDICARE

## 2019-04-22 VITALS
OXYGEN SATURATION: 99 % | WEIGHT: 153.8 LBS | HEART RATE: 54 BPM | SYSTOLIC BLOOD PRESSURE: 118 MMHG | DIASTOLIC BLOOD PRESSURE: 74 MMHG | BODY MASS INDEX: 25.99 KG/M2

## 2019-04-22 DIAGNOSIS — C18.4 MALIGNANT NEOPLASM OF TRANSVERSE COLON (HCC): ICD-10-CM

## 2019-04-22 DIAGNOSIS — Z00.00 ROUTINE GENERAL MEDICAL EXAMINATION AT A HEALTH CARE FACILITY: Primary | ICD-10-CM

## 2019-04-22 DIAGNOSIS — I10 ESSENTIAL HYPERTENSION: ICD-10-CM

## 2019-04-22 DIAGNOSIS — K22.719 BARRETT'S ESOPHAGUS WITH DYSPLASIA: ICD-10-CM

## 2019-04-22 DIAGNOSIS — Z00.00 MEDICARE ANNUAL WELLNESS VISIT, SUBSEQUENT: ICD-10-CM

## 2019-04-22 PROCEDURE — G0439 PPPS, SUBSEQ VISIT: HCPCS | Performed by: FAMILY MEDICINE

## 2019-04-22 ASSESSMENT — LIFESTYLE VARIABLES
AUDIT TOTAL SCORE: 5
HOW OFTEN DURING THE LAST YEAR HAVE YOU FAILED TO DO WHAT WAS NORMALLY EXPECTED FROM YOU BECAUSE OF DRINKING: 0
AUDIT-C TOTAL SCORE: 5
HOW OFTEN DO YOU HAVE SIX OR MORE DRINKS ON ONE OCCASION: 1
HAVE YOU OR SOMEONE ELSE BEEN INJURED AS A RESULT OF YOUR DRINKING: 0
HOW OFTEN DO YOU HAVE A DRINK CONTAINING ALCOHOL: 4
HOW OFTEN DURING THE LAST YEAR HAVE YOU FOUND THAT YOU WERE NOT ABLE TO STOP DRINKING ONCE YOU HAD STARTED: 0
HOW OFTEN DURING THE LAST YEAR HAVE YOU HAD A FEELING OF GUILT OR REMORSE AFTER DRINKING: 0
HOW OFTEN DURING THE LAST YEAR HAVE YOU BEEN UNABLE TO REMEMBER WHAT HAPPENED THE NIGHT BEFORE BECAUSE YOU HAD BEEN DRINKING: 0
HOW OFTEN DURING THE LAST YEAR HAVE YOU NEEDED AN ALCOHOLIC DRINK FIRST THING IN THE MORNING TO GET YOURSELF GOING AFTER A NIGHT OF HEAVY DRINKING: 0
HAS A RELATIVE, FRIEND, DOCTOR, OR ANOTHER HEALTH PROFESSIONAL EXPRESSED CONCERN ABOUT YOUR DRINKING OR SUGGESTED YOU CUT DOWN: 0
HOW MANY STANDARD DRINKS CONTAINING ALCOHOL DO YOU HAVE ON A TYPICAL DAY: 0

## 2019-04-22 ASSESSMENT — ANXIETY QUESTIONNAIRES: GAD7 TOTAL SCORE: 0

## 2019-04-22 ASSESSMENT — PATIENT HEALTH QUESTIONNAIRE - PHQ9
SUM OF ALL RESPONSES TO PHQ QUESTIONS 1-9: 0
SUM OF ALL RESPONSES TO PHQ QUESTIONS 1-9: 0

## 2019-04-22 NOTE — PATIENT INSTRUCTIONS
Personalized Preventive Plan for Arvind Banks - 4/22/2019  Medicare offers a range of preventive health benefits. Some of the tests and screenings are paid in full while other may be subject to a deductible, co-insurance, and/or copay. Some of these benefits include a comprehensive review of your medical history including lifestyle, illnesses that may run in your family, and various assessments and screenings as appropriate. After reviewing your medical record and screening and assessments performed today your provider may have ordered immunizations, labs, imaging, and/or referrals for you. A list of these orders (if applicable) as well as your Preventive Care list are included within your After Visit Summary for your review. Other Preventive Recommendations:    · A preventive eye exam performed by an eye specialist is recommended every 1-2 years to screen for glaucoma; cataracts, macular degeneration, and other eye disorders. · A preventive dental visit is recommended every 6 months. · Try to get at least 150 minutes of exercise per week or 10,000 steps per day on a pedometer . · Order or download the FREE \"Exercise & Physical Activity: Your Everyday Guide\" from [employer]. Call 1-268.364.6528 or search [employer] online. · You need 9530-8215 mg of calcium and 7447-6024 IU of vitamin D per day. It is possible to meet your calcium requirement with diet alone, but a vitamin D supplement is usually necessary to meet this goal.  · When exposed to the sun, use a sunscreen that protects against both UVA and UVB radiation with an SPF of 30 or greater. Reapply every 2 to 3 hours or after sweating, drying off with a towel, or swimming. · Always wear a seat belt when traveling in a car. Always wear a helmet when riding a bicycle or motorcycle.   Patient Education        Khanna's Esophagus: Care Instructions  Your Care Instructions    The esophagus is the tube that connects the throat to the stomach. Food and liquids go through this tube. In Khanna's esophagus, the cells that line the tube change. This is usually because of gastroesophageal reflux disease (GERD). GERD causes acid from your stomach to back up into the esophagus. When you have Khanna's esophagus, you are slightly more likely to get cancer of the esophagus. So regular testing is important to watch for signs of this cancer. You can treat GERD to control your symptoms and feel better. Follow-up care is a key part of your treatment and safety. Be sure to make and go to all appointments, and call your doctor if you are having problems. It's also a good idea to know your test results and keep a list of the medicines you take. How can you care for yourself at home? Take your medicines exactly as prescribed. Call your doctor if you think you are having a problem with your medicine. If you take over-the-counter medicine, such as antacids or acid reducers, follow all instructions on the label. If you use these medicines often, talk with your doctor. Be careful when you take over-the-counter antacid medicines. Many of these medicines have aspirin in them. Read the label to make sure that you are not taking more than the recommended dose. Too much aspirin can be harmful. Do not smoke or chew tobacco. Smoking can make GERD worse. If you need help quitting, talk to your doctor about stop-smoking programs and medicines. These can increase your chances of quitting for good. Chocolate, mint, and alcohol can make GERD worse. They relax the valve between the esophagus and the stomach. Spicy foods, foods that have a lot of acid (like tomatoes and oranges), and coffee can make GERD symptoms worse in some people. If your symptoms are worse after you eat a certain food, you may want to stop eating that food to see if your symptoms get better. Eat smaller meals, and more often.  After eating, wait 2 to 3 hours before you lie down. Raise the head of your bed 6 to 8 inches by putting blocks under the frame or a foam wedge under the head of the mattress. Do not wear tight clothing around your midsection. If you are overweight, lose weight. Even losing 5 to 10 pounds can help. When should you call for help? Call your doctor now or seek immediate medical care if:    You have new or worse belly pain.     You are vomiting.    Watch closely for changes in your health, and be sure to contact your doctor if:    You have any pain or difficulty swallowing.     You are losing weight.     You have new or worse symptoms, such as heartburn.     You do not get better as expected. Where can you learn more? Go to [email or web address] org and sign in to your [employer] account. Enter L146 in the [email or web address] box to learn more about \"Khanna's Esophagus: Care Instructions. \"     If you do not have an account, please click on the \"Sign Up Now\" link. Current as of: March 27, 2018  Content Version: 11.9  © 0853-6926 [employer]. Care instructions adapted under license by [facility] ([facility]). If you have questions about a medical condition or this instruction, always ask your healthcare professional. [place] [address] any warranty or liability for your use of this information.

## 2019-04-22 NOTE — PROGRESS NOTES
Medicare Annual Wellness Visit  Name: Michelle Butts Date: 2019   MRN: [id number] Sex: Female   Age: 72 y.o. Ethnicity: Non-/Non    : 1945 Race: Tobias Landers is here for Annual Exam (AWV, spots on neck, hair loss)    Screenings for behavioral, psychosocial and functional/safety risks, and cognitive dysfunction are all negative except as indicated below. These results, as well as other patient data from the [address] [facility], [place], operated by [facility] form, are documented in Flowsheets linked to this Encounter. No Known Allergies  Prior to Visit Medications    Medication Sig Taking? Authorizing Provider   ammonium lactate (AMLACTIN) 12 % cream APPLY TO AFFECTED AREA(S) DAILY AS NEEDED Yes Bri Casper MD   valACYclovir (VALTREX) 500 MG tablet TAKE TWO TABLETS BY MOUTH NOW, THEN TAKE TWO TABLET BY MOUTH IN 12 HOURS Yes Bri Casper MD   pantoprazole (PROTONIX) 40 MG tablet Take 1 tablet by mouth every morning (before breakfast) Yes Stacie Castle MD   famotidine (PEPCID) 40 MG tablet Take 1 tablet by mouth nightly Yes Stacie Castle MD   celecoxib (CELEBREX) 200 MG capsule TAKE 1 CAPSULE DAILY WITH FOOD AS NEEDED FOR PAIN Yes Bri Casper MD   lisinopril (ZESTRIL) 30 MG tablet Take 1 tablet by mouth daily Yes Bri Casper MD   DULoxetine (CYMBALTA) 30 MG extended release capsule TAKE 1 CAPSULE DAILY Yes Bri Casper MD   Cholecalciferol (VITAMIN D) 2000 UNITS CAPS capsule Take 1 capsule by mouth daily  Yes Historical Provider, MD   zolpidem (AMBIEN CR) 6.25 MG extended release tablet TAKE ONE TABLET BY MOUTH EVERY NIGHT AT BEDTIME AS NEEDED.   Bri Casper MD     Past Medical History:   Diagnosis Date    Esophagus, Khanna's     Essential hypertension 2016    GERD (gastroesophageal reflux disease)     HH (hiatus hernia) 11    small    IBS (irritable bowel syndrome)     Insomnia     Memory disorder 2016    TMJ (temporomandibular joint disorder)      Past Surgical History:   Procedure Laterality Date    COLON SURGERY  04/10/2017    transverse colectomy - Dr. Tracie Medina COLONOSCOPY  2000,2003,2006,11,3/5/18    5 years: moderate pan colonic diverticulosis, transverse colon anastomosis, Dr Quesada Husbands Right 11/2017    SINUS SURGERY  2009    TONSILLECTOMY      TOTAL HIP ARTHROPLASTY Left     2016    UPPER GASTROINTESTINAL ENDOSCOPY  2004,2006    UPPER GASTROINTESTINAL ENDOSCOPY  07/25/11    with biopsy, and with esophageal balloon dilation    UPPER GASTROINTESTINAL ENDOSCOPY N/A 11/30/2018    EGD DILATION BALLOON performed by Johnathan Lux MD at [address] [place] [address]     Family History   Problem Relation Age of Onset    Colon Cancer Mother     Other Father         diverticulitis    Hypertension Father        CareTeam (Including outside providers/suppliers regularly involved in providing care):   Patient Care Team:  Osvaldo Nguyen MD as PCP - General (Family Medicine)  Kristopher Raygoza MD as PCP - Hematology/Oncology (Hematology and Oncology)  GIANNA Neal - CNP as PCP - [facility] Attributed Provider  Arie Woodward MD as Consulting Physician (Urology)  Ky Dominguez MD (Rheumatology)  Latia Webber MD (Obstetrics & Gynecology)  Angel Ha. (Dermatology)  Eber Fabian MD as Consulting Physician (Dermatology)  Johnathan Lux MD as Consulting Physician (Gastroenterology)  Clayton Gomez MD as Referring Physician (General Surgery)    Wt Readings from Last 3 Encounters:   04/22/19 153 lb 12.8 oz (69.8 kg)   01/28/19 152 lb 6.4 oz (69.1 kg)   11/30/18 146 lb (66.2 kg)     Vitals:    04/22/19 0938   BP: 118/74   Site: Left Upper Arm   Position: Sitting   Cuff Size: Small Adult   Pulse: 54   SpO2: 99%   Weight: 153 lb 12.8 oz (69.8 kg)     Body mass index is 25.99 kg/m². Based upon direct observation of the patient, evaluation of cognition reveals recent and remote memory intact.     Vitals: 04/22/19 0938   BP: 118/74   Site: Left Upper Arm   Position: Sitting   Cuff Size: Small Adult   Pulse: 54   SpO2: 99%   Weight: 153 lb 12.8 oz (69.8 kg)     Body mass index is 25.99 kg/m². General Appearance: alert and oriented, in no acute distress  Skin: warm and dry, no rash or erythema  Head: normocephalic and atraumatic  Eyes: pupils equal, round, and reactive to light, extraocular eye movements intact, conjunctivae normal  ENT: tympanic membrane, external ear and ear canal normal bilaterally, nose without deformity,   Neck: supple and non-tender without mass, no thyromegaly or thyroid nodules, no cervical lymphadenopathy  Pulmonary/Chest: clear to auscultation bilaterally- no wheezes, rales or rhonchi, normal air movement, no respiratory distress  Cardiovascular: normal rate, regular rhythm, normal S1 and S2, no murmurs, rubs, clicks, or gallops, no carotid bruits  Abdomen: soft, non-tender, non-distended, normal bowel sounds, no masses or organomegaly  Extremities: no cyanosis, clubbing or edema  Neurologic: reflexes normal and symmetric, no cranial nerve deficit, speech normal  GYN:Rectal: deferred to Gynecologist  Breast: deferred to Gynecologist     Patient's complete Health Risk Assessment and screening values have been reviewed and are found in Flowsheets. The following problems were reviewed today and where indicated follow up appointments were made and/or referrals ordered. Positive Risk Factor Screenings with Interventions:     No Positive Risk Factors identified today.     Personalized Preventive Plan   Current Health Maintenance Status  Immunization History   Administered Date(s) Administered    Influenza, High Dose (Fluzone 65 yrs and older) 09/23/2013, 10/13/2014, 10/26/2015, 09/19/2016, 09/17/2018    Influenza, Intradermal, Preservative free 11/14/2012    Pneumococcal 13-valent Conjugate ([id number]) 10/26/2015    Pneumococcal Polysaccharide ([id number]) 11/14/2012    Tdap (Boostrix,

## 2019-04-30 DIAGNOSIS — G47.00 INSOMNIA, UNSPECIFIED TYPE: ICD-10-CM

## 2019-04-30 RX ORDER — ZOLPIDEM TARTRATE 6.25 MG/1
TABLET, FILM COATED, EXTENDED RELEASE ORAL
Qty: 90 TABLET | Refills: 0 | Status: SHIPPED | OUTPATIENT
Start: 2019-04-30 | End: 2019-08-02 | Stop reason: SDUPTHER

## 2019-05-20 DIAGNOSIS — G89.29 CHRONIC BILATERAL LOW BACK PAIN WITHOUT SCIATICA: ICD-10-CM

## 2019-05-20 DIAGNOSIS — M54.50 CHRONIC BILATERAL LOW BACK PAIN WITHOUT SCIATICA: ICD-10-CM

## 2019-05-20 RX ORDER — CELECOXIB 200 MG/1
CAPSULE ORAL
Qty: 90 CAPSULE | Refills: 1 | Status: SHIPPED | OUTPATIENT
Start: 2019-05-20 | End: 2020-08-18

## 2019-05-20 NOTE — TELEPHONE ENCOUNTER
Last ov: 04/22/19, Medicare Wellness  Last refill: 11/12/18, #90, 1 refills    Lab Results   Component Value Date     02/05/2019    K 4.7 02/05/2019     02/05/2019    CO2 29 02/05/2019    BUN 11 02/05/2019    CREATININE 0.60 02/05/2019    GLUCOSE 98 02/05/2019    CALCIUM 8.8 02/05/2019    PROT 6.6 01/29/2019    LABALBU 4.0 01/29/2019    BILITOT 0.6 09/17/2018    ALKPHOS 72 09/17/2018    AST 17 09/17/2018    ALT 11 09/17/2018    LABGLOM >60 09/17/2018    GFRAA >60 09/17/2018    AGRATIO 2.2 09/17/2018    GLOB 2.0 09/17/2018

## 2019-05-21 ENCOUNTER — TELEPHONE (OUTPATIENT)
Dept: FAMILY MEDICINE CLINIC | Age: 74
End: 2019-05-21

## 2019-05-21 NOTE — TELEPHONE ENCOUNTER
Patient is seeing a dermatologist and they want blood work done. She wants to know if her lab work done here in January would be sufficient.   They are requesting the following:    Vit D3 levels  Seratin 25-OH  DHEA sulfate  Testosterone    Please let patient know if she needs additional blood work for these particular tests

## 2019-07-09 ENCOUNTER — OFFICE VISIT (OUTPATIENT)
Dept: FAMILY MEDICINE CLINIC | Age: 74
End: 2019-07-09
Payer: MEDICARE

## 2019-07-09 VITALS
DIASTOLIC BLOOD PRESSURE: 88 MMHG | WEIGHT: 152 LBS | BODY MASS INDEX: 25.69 KG/M2 | OXYGEN SATURATION: 97 % | HEART RATE: 69 BPM | SYSTOLIC BLOOD PRESSURE: 122 MMHG

## 2019-07-09 DIAGNOSIS — G89.29 CHRONIC BILATERAL LOW BACK PAIN WITHOUT SCIATICA: ICD-10-CM

## 2019-07-09 DIAGNOSIS — R39.9 UTI SYMPTOMS: Primary | ICD-10-CM

## 2019-07-09 DIAGNOSIS — R41.3 MEMORY DISORDER: ICD-10-CM

## 2019-07-09 DIAGNOSIS — I10 ESSENTIAL HYPERTENSION: ICD-10-CM

## 2019-07-09 DIAGNOSIS — M54.50 CHRONIC BILATERAL LOW BACK PAIN WITHOUT SCIATICA: ICD-10-CM

## 2019-07-09 LAB
BILIRUBIN, POC: NORMAL
BLOOD URINE, POC: NORMAL
CLARITY, POC: NORMAL
COLOR, POC: YELLOW
GLUCOSE URINE, POC: NORMAL
KETONES, POC: NORMAL
LEUKOCYTE EST, POC: NORMAL
NITRITE, POC: POSITIVE
PH, POC: 6.5
PROTEIN, POC: NORMAL
SPECIFIC GRAVITY, POC: 1.02
UROBILINOGEN, POC: 0.2

## 2019-07-09 PROCEDURE — 99214 OFFICE O/P EST MOD 30 MIN: CPT | Performed by: FAMILY MEDICINE

## 2019-07-09 PROCEDURE — 81002 URINALYSIS NONAUTO W/O SCOPE: CPT | Performed by: FAMILY MEDICINE

## 2019-07-09 RX ORDER — NITROFURANTOIN 25; 75 MG/1; MG/1
100 CAPSULE ORAL 2 TIMES DAILY
Qty: 14 CAPSULE | Refills: 0 | Status: SHIPPED | OUTPATIENT
Start: 2019-07-09 | End: 2019-07-16

## 2019-07-09 RX ORDER — LISINOPRIL 30 MG/1
30 TABLET ORAL DAILY
Qty: 90 TABLET | Refills: 3 | Status: SHIPPED | OUTPATIENT
Start: 2019-07-09 | End: 2020-09-28 | Stop reason: SDUPTHER

## 2019-07-09 RX ORDER — DULOXETIN HYDROCHLORIDE 60 MG/1
60 CAPSULE, DELAYED RELEASE ORAL DAILY
COMMUNITY
Start: 2014-07-24 | End: 2020-12-08 | Stop reason: SDUPTHER

## 2019-07-09 ASSESSMENT — ENCOUNTER SYMPTOMS
RESPIRATORY NEGATIVE: 1
GASTROINTESTINAL NEGATIVE: 1

## 2019-07-12 LAB
ORGANISM: ABNORMAL
URINE CULTURE, ROUTINE: ABNORMAL
URINE CULTURE, ROUTINE: ABNORMAL

## 2019-07-15 ENCOUNTER — TELEPHONE (OUTPATIENT)
Dept: FAMILY MEDICINE CLINIC | Age: 74
End: 2019-07-15

## 2019-07-15 NOTE — TELEPHONE ENCOUNTER
[employer] is calling and states we need to do a PA for MRI Brain WO Contrast     Please call [employer] [phone] to get this started.

## 2019-07-23 ENCOUNTER — HOSPITAL ENCOUNTER (OUTPATIENT)
Dept: MRI IMAGING | Age: 74
Discharge: HOME | End: 2019-07-23
Payer: MEDICARE

## 2019-07-23 DIAGNOSIS — R41.3 MEMORY DISORDER: ICD-10-CM

## 2019-07-23 PROCEDURE — 70551 MRI BRAIN STEM W/O DYE: CPT

## 2019-07-25 ENCOUNTER — TELEPHONE (OUTPATIENT)
Dept: FAMILY MEDICINE CLINIC | Age: 74
End: 2019-07-25

## 2019-07-25 NOTE — TELEPHONE ENCOUNTER
Pt would like results of the MRI Brain that she had done at [facility] July 23rd.

## 2019-08-02 DIAGNOSIS — G47.00 INSOMNIA, UNSPECIFIED TYPE: ICD-10-CM

## 2019-08-02 RX ORDER — ZOLPIDEM TARTRATE 6.25 MG/1
TABLET, FILM COATED, EXTENDED RELEASE ORAL
Qty: 90 TABLET | Refills: 0 | Status: SHIPPED | OUTPATIENT
Start: 2019-08-02 | End: 2019-10-22 | Stop reason: SDUPTHER

## 2019-08-02 NOTE — TELEPHONE ENCOUNTER
Medication and Quantity requested: zolpidem (AMBIEN CR) 6.25 MG extended release tablet          Last Visit  7/9/19     Pharmacy and phone number updated in EPIC:  Yes                                                [facility]

## 2019-10-21 DIAGNOSIS — G47.00 INSOMNIA, UNSPECIFIED TYPE: ICD-10-CM

## 2019-10-22 RX ORDER — ZOLPIDEM TARTRATE 6.25 MG/1
TABLET, FILM COATED, EXTENDED RELEASE ORAL
Qty: 90 TABLET | Refills: 0 | Status: SHIPPED | OUTPATIENT
Start: 2019-10-22 | End: 2020-01-24 | Stop reason: SDUPTHER

## 2019-11-25 LAB
ALBUMIN SERPL-MCNC: 4.3 G/DL (ref 3.4–5)
ALP BLD-CCNC: 69 U/L (ref 40–129)
ALT SERPL-CCNC: 11 U/L (ref 10–40)
AST SERPL-CCNC: 21 U/L (ref 15–37)
BILIRUB SERPL-MCNC: 0.4 MG/DL (ref 0–1)
BILIRUBIN DIRECT: <0.2 MG/DL (ref 0–0.3)
BILIRUBIN, INDIRECT: NORMAL MG/DL (ref 0–1)
TOTAL PROTEIN: 6.6 G/DL (ref 6.4–8.2)

## 2019-12-30 ENCOUNTER — HOSPITAL ENCOUNTER (OUTPATIENT)
Dept: MAMMOGRAPHY | Age: 74
Discharge: HOME | End: 2019-12-30
Payer: MEDICARE

## 2019-12-30 DIAGNOSIS — Z12.31 ENCOUNTER FOR SCREENING MAMMOGRAM FOR BREAST CANCER: ICD-10-CM

## 2019-12-30 PROCEDURE — 77067 SCR MAMMO BI INCL CAD: CPT

## 2019-12-30 PROCEDURE — 77063 BREAST TOMOSYNTHESIS BI: CPT

## 2020-01-24 RX ORDER — ZOLPIDEM TARTRATE 6.25 MG/1
TABLET, FILM COATED, EXTENDED RELEASE ORAL
Qty: 90 TABLET | Refills: 0 | Status: SHIPPED | OUTPATIENT
Start: 2020-01-24 | End: 2020-03-19 | Stop reason: SDUPTHER

## 2020-01-24 NOTE — TELEPHONE ENCOUNTER
Medication and Quantity requested: zolpidem (AMBIEN CR) 6.25 MG extended release tablet          Last Visit  7/9/19    Pharmacy and phone number updated in EPIC:  yes                                            [facility]

## 2020-01-31 ENCOUNTER — TELEPHONE (OUTPATIENT)
Dept: FAMILY MEDICINE CLINIC | Age: 75
End: 2020-01-31

## 2020-01-31 NOTE — TELEPHONE ENCOUNTER
Received letter from [address] that pt's Ambien (Zoljosem) is not on pt's formulary. Called to inform pt and she is going to call her insurance to see what is on the formulary.

## 2020-01-31 NOTE — TELEPHONE ENCOUNTER
Maria Dolores Bhakta 2 hours ago (12:02 PM)         Pt would like a return call in regards to medication          Documentation

## 2020-03-02 ENCOUNTER — TELEPHONE (OUTPATIENT)
Dept: FAMILY MEDICINE CLINIC | Age: 75
End: 2020-03-02

## 2020-03-02 RX ORDER — RAMELTEON 8 MG/1
8 TABLET ORAL NIGHTLY PRN
Qty: 90 TABLET | Refills: 0 | Status: SHIPPED | OUTPATIENT
Start: 2020-03-02 | End: 2020-08-18

## 2020-03-02 NOTE — TELEPHONE ENCOUNTER
Pt notified of message.  She says she has no idea and is wanting you to pick one you think will be best.

## 2020-03-02 NOTE — TELEPHONE ENCOUNTER
Fax received from [facility] stating Zolpidem is not covered by patient's insurance. Covered alternatives are Ramelteon tabs and Trazodone HCL tabs. Please sent new script.

## 2020-07-13 RX ORDER — ZOLPIDEM TARTRATE 6.25 MG/1
TABLET, FILM COATED, EXTENDED RELEASE ORAL
Qty: 90 TABLET | Refills: 0 | Status: SHIPPED | OUTPATIENT
Start: 2020-07-13 | End: 2020-10-02 | Stop reason: SDUPTHER

## 2020-07-20 PROBLEM — Z85.038 HISTORY OF COLON CANCER: Status: ACTIVE | Noted: 2017-06-03

## 2020-08-11 DIAGNOSIS — I10 ESSENTIAL HYPERTENSION: ICD-10-CM

## 2020-08-11 LAB
A/G RATIO: 2 (ref 1.1–2.2)
ALBUMIN SERPL-MCNC: 4.3 G/DL (ref 3.4–5)
ALP BLD-CCNC: 70 U/L (ref 40–129)
ALT SERPL-CCNC: 16 U/L (ref 10–40)
ANION GAP SERPL CALCULATED.3IONS-SCNC: 11 MMOL/L (ref 3–16)
AST SERPL-CCNC: 22 U/L (ref 15–37)
BASOPHILS ABSOLUTE: 0 K/UL (ref 0–0.2)
BASOPHILS RELATIVE PERCENT: 0.9 %
BILIRUB SERPL-MCNC: 0.5 MG/DL (ref 0–1)
BUN BLDV-MCNC: 20 MG/DL (ref 7–20)
CALCIUM SERPL-MCNC: 9.5 MG/DL (ref 8.3–10.6)
CHLORIDE BLD-SCNC: 102 MMOL/L (ref 99–110)
CHOLESTEROL, TOTAL: 217 MG/DL (ref 0–199)
CO2: 25 MMOL/L (ref 21–32)
CREAT SERPL-MCNC: 0.6 MG/DL (ref 0.6–1.2)
EOSINOPHILS ABSOLUTE: 0.2 K/UL (ref 0–0.6)
EOSINOPHILS RELATIVE PERCENT: 4.5 %
GFR AFRICAN AMERICAN: >60
GFR NON-AFRICAN AMERICAN: >60
GLOBULIN: 2.1 G/DL
GLUCOSE BLD-MCNC: 110 MG/DL (ref 70–99)
HCT VFR BLD CALC: 43.6 % (ref 36–48)
HDLC SERPL-MCNC: 98 MG/DL (ref 40–60)
HEMOGLOBIN: 14.2 G/DL (ref 12–16)
LDL CHOLESTEROL CALCULATED: 110 MG/DL
LYMPHOCYTES ABSOLUTE: 1.5 K/UL (ref 1–5.1)
LYMPHOCYTES RELATIVE PERCENT: 29.9 %
MCH RBC QN AUTO: 31.9 PG (ref 26–34)
MCHC RBC AUTO-ENTMCNC: 32.6 G/DL (ref 31–36)
MCV RBC AUTO: 98.1 FL (ref 80–100)
MONOCYTES ABSOLUTE: 0.5 K/UL (ref 0–1.3)
MONOCYTES RELATIVE PERCENT: 11 %
NEUTROPHILS ABSOLUTE: 2.6 K/UL (ref 1.7–7.7)
NEUTROPHILS RELATIVE PERCENT: 53.7 %
PDW BLD-RTO: 13.3 % (ref 12.4–15.4)
PLATELET # BLD: 257 K/UL (ref 135–450)
PMV BLD AUTO: 9 FL (ref 5–10.5)
POTASSIUM SERPL-SCNC: 4.5 MMOL/L (ref 3.5–5.1)
RBC # BLD: 4.44 M/UL (ref 4–5.2)
SODIUM BLD-SCNC: 138 MMOL/L (ref 136–145)
TOTAL PROTEIN: 6.4 G/DL (ref 6.4–8.2)
TRIGL SERPL-MCNC: 44 MG/DL (ref 0–150)
TSH REFLEX: 1.4 UIU/ML (ref 0.27–4.2)
VLDLC SERPL CALC-MCNC: 9 MG/DL
WBC # BLD: 4.9 K/UL (ref 4–11)

## 2020-08-18 ENCOUNTER — OFFICE VISIT (OUTPATIENT)
Dept: FAMILY MEDICINE CLINIC | Age: 75
End: 2020-08-18
Payer: MEDICARE

## 2020-08-18 VITALS
SYSTOLIC BLOOD PRESSURE: 136 MMHG | HEART RATE: 67 BPM | DIASTOLIC BLOOD PRESSURE: 86 MMHG | WEIGHT: 153 LBS | TEMPERATURE: 97.1 F | OXYGEN SATURATION: 99 % | BODY MASS INDEX: 25.86 KG/M2

## 2020-08-18 PROBLEM — C18.4 MALIGNANT NEOPLASM OF TRANSVERSE COLON (HCC): Status: ACTIVE | Noted: 2020-08-18

## 2020-08-18 PROCEDURE — G0439 PPPS, SUBSEQ VISIT: HCPCS | Performed by: FAMILY MEDICINE

## 2020-08-18 ASSESSMENT — LIFESTYLE VARIABLES
HOW OFTEN DURING THE LAST YEAR HAVE YOU FOUND THAT YOU WERE NOT ABLE TO STOP DRINKING ONCE YOU HAD STARTED: 0
HOW OFTEN DO YOU HAVE A DRINK CONTAINING ALCOHOL: 4
AUDIT TOTAL SCORE: 4
HOW OFTEN DURING THE LAST YEAR HAVE YOU HAD A FEELING OF GUILT OR REMORSE AFTER DRINKING: 0
HOW OFTEN DURING THE LAST YEAR HAVE YOU NEEDED AN ALCOHOLIC DRINK FIRST THING IN THE MORNING TO GET YOURSELF GOING AFTER A NIGHT OF HEAVY DRINKING: 0
HOW OFTEN DO YOU HAVE SIX OR MORE DRINKS ON ONE OCCASION: 0
HOW MANY STANDARD DRINKS CONTAINING ALCOHOL DO YOU HAVE ON A TYPICAL DAY: 0
HAS A RELATIVE, FRIEND, DOCTOR, OR ANOTHER HEALTH PROFESSIONAL EXPRESSED CONCERN ABOUT YOUR DRINKING OR SUGGESTED YOU CUT DOWN: 0
HOW OFTEN DURING THE LAST YEAR HAVE YOU FAILED TO DO WHAT WAS NORMALLY EXPECTED FROM YOU BECAUSE OF DRINKING: 0
HAVE YOU OR SOMEONE ELSE BEEN INJURED AS A RESULT OF YOUR DRINKING: 0
HOW OFTEN DURING THE LAST YEAR HAVE YOU BEEN UNABLE TO REMEMBER WHAT HAPPENED THE NIGHT BEFORE BECAUSE YOU HAD BEEN DRINKING: 0
AUDIT-C TOTAL SCORE: 4

## 2020-08-18 ASSESSMENT — PATIENT HEALTH QUESTIONNAIRE - PHQ9
SUM OF ALL RESPONSES TO PHQ QUESTIONS 1-9: 0
SUM OF ALL RESPONSES TO PHQ QUESTIONS 1-9: 0

## 2020-08-18 NOTE — PROGRESS NOTES
Medicare Annual Wellness Visit  Name: Yesi Almonte Date: 2020   MRN: [id number] Sex: Female   Age: 69 y.o. Ethnicity: Non-/Non    : 1945 Race: Renetta Kidney is here for Medicare AWV    Screenings for behavioral, psychosocial and functional/safety risks, and cognitive dysfunction are all negative except as indicated below. These results, as well as other patient data from the [address] form, are documented in Flowsheets linked to this Encounter. No Known Allergies      Prior to Visit Medications    Medication Sig Taking?  Authorizing Provider   DULoxetine (CYMBALTA) 60 MG extended release capsule Take 60 mg by mouth daily Yes Historical Provider, MD   ammonium lactate (AMLACTIN) 12 % cream APPLY TO AFFECTED AREA(S) DAILY AS NEEDED Yes Dillon Veliz MD   valACYclovir (VALTREX) 500 MG tablet TAKE TWO TABLETS BY MOUTH NOW, THEN TAKE TWO TABLET BY MOUTH IN 12 HOURS Yes Dillon Veliz MD   pantoprazole (PROTONIX) 40 MG tablet Take 1 tablet by mouth every morning (before breakfast) Yes Gayle Lemus MD   Cholecalciferol (VITAMIN D) 2000 UNITS CAPS capsule Take 1 capsule by mouth daily  Yes Historical Provider, MD   zolpidem (AMBIEN CR) 6.25 MG extended release tablet TAKE ONE TABLET BY MOUTH EVERY NIGHT AT BEDTIME AS NEEDED  Dillon Veliz MD   lisinopril (ZESTRIL) 30 MG tablet Take 1 tablet by mouth daily  Dillon Veliz MD   celecoxib (CELEBREX) 200 MG capsule TAKE 1 CAPSULE DAILY WITH FOOD AS NEEDED FOR PAIN  Patient not taking: Reported on 2020  Dillon Veliz MD         Past Medical History:   Diagnosis Date    Esophagus, Khanna's     Essential hypertension 2016    GERD (gastroesophageal reflux disease)     HH (hiatus hernia) 11    small    IBS (irritable bowel syndrome)     Insomnia     Memory disorder 2016    TMJ (temporomandibular joint disorder)        Past Surgical History:   Procedure Laterality Date    COLON SURGERY  04/10/2017    transverse colectomy - Dr. Rachel Coronel COLONOSCOPY  2000,2003,2006,11,3/5/18    5 years: moderate pan colonic diverticulosis, transverse colon anastomosis, Dr Joaquin Baer Right 11/2017    SINUS SURGERY  2009    TONSILLECTOMY      TOTAL HIP ARTHROPLASTY Left     2016    UPPER GASTROINTESTINAL ENDOSCOPY  2004,2006    UPPER GASTROINTESTINAL ENDOSCOPY  07/25/11    with biopsy, and with esophageal balloon dilation    UPPER GASTROINTESTINAL ENDOSCOPY N/A 11/30/2018    EGD DILATION BALLOON performed by Alisa Sanchez MD at [address] [place] [address]         Family History   Problem Relation Age of Onset    Colon Cancer Mother     Other Father         diverticulitis    Hypertension Father        CareTeam (Including outside providers/suppliers regularly involved in providing care):   Patient Care Team:  Mary Carmen Vergara MD as PCP - General (Family Medicine)  Mary Carmen Vergara MD as PCP - [facility] Provider  Reilly Monahan MD as Consulting Physician (Urology)  Chaparro Hirsch MD (Rheumatology)  Alaina Real MD (Obstetrics & Gynecology)  Zeyad Moore. (Dermatology)  Gilda Pierce MD as Consulting Physician (Dermatology)  Alisa Sanchez MD as Consulting Physician (Gastroenterology)  Anupama Wing MD as Referring Physician (General Surgery)    Wt Readings from Last 3 Encounters:   08/18/20 153 lb (69.4 kg)   07/09/19 152 lb (68.9 kg)   04/22/19 153 lb 12.8 oz (69.8 kg)     Vitals:    08/18/20 1320   BP: 136/86   Pulse: 67   Temp: 97.1 °F (36.2 °C)   SpO2: 99%   Weight: 153 lb (69.4 kg)     Body mass index is 25.86 kg/m². Based upon direct observation of the patient, evaluation of cognition reveals mild/minimal memory issues but better. Vitals:    08/18/20 1320   BP: 136/86   Pulse: 67   Temp: 97.1 °F (36.2 °C)   SpO2: 99%   Weight: 153 lb (69.4 kg)     Body mass index is 25.86 kg/m².    General Appearance: alert and oriented, in no acute distress  Skin: warm and dry, no rash or erythema  Head: normocephalic and atraumatic  Eyes: pupils equal, round, and reactive to light, extraocular eye movements intact, conjunctivae normal  ENT: tympanic membrane, external ear and ear canal normal bilaterally, nose without deformity,   Neck: supple and non-tender without mass, no thyromegaly or thyroid nodules, no cervical lymphadenopathy  Pulmonary/Chest: clear to auscultation bilaterally- no wheezes, rales or rhonchi, normal air movement, no respiratory distress  Cardiovascular: normal rate, regular rhythm, normal S1 and S2, no murmurs, rubs, clicks, or gallops, no carotid bruits  Abdomen: soft, non-tender, non-distended, normal bowel sounds, no masses or organomegaly  Extremities: no cyanosis, clubbing or edema  Neurologic: reflexes normal and symmetric, no cranial nerve deficit, speech normal  GYN:Rectal: deferred to Gynecologist  Breast: deferred to Gynecologist     Patient's complete Health Risk Assessment and screening values have been reviewed and are found in Flowsheets. The following problems were reviewed today and where indicated follow up appointments were made and/or referrals ordered.     Positive Risk Factor Screenings with Interventions:     Safety:  Safety  Do you have working smoke detectors?: Yes  Have all throw rugs been removed or fastened?: (!) No  Do you have non-slip mats or surfaces in all bathtubs/showers?: Yes  Do all of your stairways have a railing or banister?: Yes  Are your doorways, halls and stairs free of clutter?: Yes  Do you always fasten your seatbelt when you are in a car?: Yes  Safety Interventions:  · no new issues    Personalized Preventive Plan   Current Health Maintenance Status  Immunization History   Administered Date(s) Administered    Influenza, High Dose (Fluzone 65 yrs and older) 09/23/2013, 10/13/2014, 10/26/2015, 09/19/2016, 09/17/2018    Influenza, Intradermal, Preservative free 11/14/2012    Pneumococcal Conjugate 13-valent ([id number]) 10/26/2015    Pneumococcal Polysaccharide ([id number]) 11/14/2012    Tdap (Boostrix, Adacel) 06/12/2012    Zoster Live (Zostavax) 01/01/2011        Health Maintenance   Topic Date Due    Annual Wellness Visit (AWV)  05/29/2019    Shingles Vaccine (3 of 3) 12/28/2019    Flu vaccine (1) 09/01/2020    Colon cancer screen colonoscopy  03/05/2021    Potassium monitoring  08/11/2021    Creatinine monitoring  08/11/2021    Breast cancer screen  12/30/2021    DTaP/Tdap/Td vaccine (2 - Td) 06/12/2022    Lipid screen  08/11/2025    DEXA (modify frequency per FRAX score)  Completed    Pneumococcal 65+ years Vaccine  Completed    Hepatitis C screen  Completed    Hepatitis A vaccine  Aged Out    Hepatitis B vaccine  Aged Out    Hib vaccine  Aged Out    Meningococcal (ACWY) vaccine  Aged Out     Recommendations for [employer] Due: see orders and patient instructions/AVS.  . Recommended screening schedule for the next 5-10 years is provided to the patient in written form: see Patient Instructions/AVS.    Edwige Martinez was seen today for medicare awv. Diagnoses and all orders for this visit:    Medicare annual wellness visit, subsequent/Routine general medical examination at a health care facility  Return 1 year  Essential hypertension  Reasonably well controlled  Continue current treatment  Home BP checks  Return 6 months     Malignant neoplasm of transverse colon [facility])  Patient states she had a recent colonoscopy finding another polyp and will repeat again in 3 years  Other insomnia  [place] report reviewed and no inconsistencies noted   The patient understands the risks of dependency/addiction with zolpidem and will take as little as possible and discontinue as soon as possible  She states this continues to work quite well  Memory disorder  She states this is not really an issue. She is on no medication.   She thinks perhaps she had other issues going on when this appeared to be at its worse.

## 2020-08-18 NOTE — PATIENT INSTRUCTIONS
Personalized Preventive Plan for Mk Quinn - 8/18/2020  Medicare offers a range of preventive health benefits. Some of the tests and screenings are paid in full while other may be subject to a deductible, co-insurance, and/or copay. Some of these benefits include a comprehensive review of your medical history including lifestyle, illnesses that may run in your family, and various assessments and screenings as appropriate. After reviewing your medical record and screening and assessments performed today your provider may have ordered immunizations, labs, imaging, and/or referrals for you. A list of these orders (if applicable) as well as your Preventive Care list are included within your After Visit Summary for your review. Other Preventive Recommendations:    · A preventive eye exam performed by an eye specialist is recommended every 1-2 years to screen for glaucoma; cataracts, macular degeneration, and other eye disorders. · A preventive dental visit is recommended every 6 months. · Try to get at least 150 minutes of exercise per week or 10,000 steps per day on a pedometer . · Order or download the FREE \"Exercise & Physical Activity: Your Everyday Guide\" from [employer]. Call 8-953.816.8520 or search [employer] online. · You need 8662-2144 mg of calcium and 6659-1441 IU of vitamin D per day. It is possible to meet your calcium requirement with diet alone, but a vitamin D supplement is usually necessary to meet this goal.  · When exposed to the sun, use a sunscreen that protects against both UVA and UVB radiation with an SPF of 30 or greater. Reapply every 2 to 3 hours or after sweating, drying off with a towel, or swimming. · Always wear a seat belt when traveling in a car. Always wear a helmet when riding a bicycle or motorcycle.

## 2020-08-19 ENCOUNTER — TELEPHONE (OUTPATIENT)
Dept: FAMILY MEDICINE CLINIC | Age: 75
End: 2020-08-19

## 2020-08-19 NOTE — TELEPHONE ENCOUNTER
She should still get the second dose of Shingrix.   Since it is been greater than 6 months she should get it as soon as possible

## 2020-08-25 ENCOUNTER — TELEPHONE (OUTPATIENT)
Dept: FAMILY MEDICINE CLINIC | Age: 75
End: 2020-08-25

## 2020-08-25 NOTE — TELEPHONE ENCOUNTER
Pt got 2nd shingles shot on 8-22 at [facility]3], also she got flu shot at same time.     FYI please benjamin her record

## 2020-09-28 RX ORDER — LISINOPRIL 30 MG/1
30 TABLET ORAL DAILY
Qty: 90 TABLET | Refills: 3 | Status: SHIPPED | OUTPATIENT
Start: 2020-09-28 | End: 2020-12-08 | Stop reason: SDUPTHER

## 2020-10-02 RX ORDER — ZOLPIDEM TARTRATE 6.25 MG/1
TABLET, FILM COATED, EXTENDED RELEASE ORAL
Qty: 90 TABLET | Refills: 0 | Status: SHIPPED | OUTPATIENT
Start: 2020-10-02 | End: 2020-12-14

## 2020-10-02 NOTE — TELEPHONE ENCOUNTER
Medication and Quantity requested:    zolpidem (AMBIEN CR) 6.25 MG extended release tablet - qty 90      Last Visit  08/18/20 - Dr Oren Mojica and phone number updated in EPIC:   Yes    [facility]

## 2020-11-28 ENCOUNTER — NURSE TRIAGE (OUTPATIENT)
Dept: OTHER | Facility: CLINIC | Age: 75
End: 2020-11-28

## 2020-11-28 NOTE — TELEPHONE ENCOUNTER
Patient called [address] contact center [facility]) with red flag complaint; transferred for triage by Mark Smallwood (agent's name). Reason for Disposition   Earache  (Exceptions: brief ear pain of < 60 minutes duration, earache occurring during air travel    Answer Assessment - Initial Assessment Questions  1. LOCATION: \"Which ear is involved? \"      Left    2. ONSET: \"When did the ear start hurting\"       1.5 wk    3. SEVERITY: \"How bad is the pain? \"  (Scale 1-10; mild, moderate or severe)    - MILD (1-3): doesn't interfere with normal activities     - MODERATE (4-7): interferes with normal activities or awakens from sleep     - SEVERE (8-10): excruciating pain, unable to do any normal activities       3/10    4. URI SYMPTOMS: \" Do you have a runny nose or cough? \"      No    5. FEVER: \"Do you have a fever? \" If so, ask: \"What is your temperature, how was it measured, and when did it start? \"      No    6. CAUSE: \"Have you been swimming recently? \", \"How often do you use Q-TIPS? \", \"Have you had any recent air travel or scuba diving? \"      No    7. OTHER SYMPTOMS: \"Do you have any other symptoms? \" (e.g., headache, stiff neck, dizziness, vomiting, runny nose, decreased hearing)      \"radiates down into neck\"; hx of TMJ    8. PREGNANCY: \"Is there any chance you are pregnant? \" \"When was your last menstrual period? \"      No    Protocols used: EARACHE-ADULT-AH    Informed of disposition. Provided with telehealth options and walk-in clinics. Care advice as documented. Instructed to call back with worsening symptoms. Verbalized understanding and denies any further questions/concerns. Attention Provider: Thank you for allowing me to participate in the care of your patient. The patient was connected to triage in response to information provided to the [facility]. Please do not respond through this encounter as the response is not directed to a shared pool.

## 2020-11-30 ENCOUNTER — TELEPHONE (OUTPATIENT)
Dept: FAMILY MEDICINE CLINIC | Age: 75
End: 2020-11-30

## 2020-11-30 RX ORDER — VALACYCLOVIR HYDROCHLORIDE 500 MG/1
TABLET, FILM COATED ORAL
Qty: 12 TABLET | Refills: 2 | Status: SHIPPED | OUTPATIENT
Start: 2020-11-30 | End: 2022-02-22

## 2020-11-30 NOTE — TELEPHONE ENCOUNTER
Medication and Quantity requested: valACYclovir (VALTREX) 500 MG tablet          Last Visit  8/18/20    Pharmacy and phone number updated in EPIC:  Yes  [facility]

## 2020-12-08 ENCOUNTER — OFFICE VISIT (OUTPATIENT)
Dept: FAMILY MEDICINE CLINIC | Age: 75
End: 2020-12-08
Payer: MEDICARE

## 2020-12-08 VITALS
HEART RATE: 75 BPM | OXYGEN SATURATION: 99 % | TEMPERATURE: 96.8 F | SYSTOLIC BLOOD PRESSURE: 128 MMHG | DIASTOLIC BLOOD PRESSURE: 70 MMHG | HEIGHT: 65 IN | WEIGHT: 153 LBS | BODY MASS INDEX: 25.49 KG/M2

## 2020-12-08 PROCEDURE — 99213 OFFICE O/P EST LOW 20 MIN: CPT | Performed by: NURSE PRACTITIONER

## 2020-12-08 RX ORDER — PANTOPRAZOLE SODIUM 40 MG/1
40 TABLET, DELAYED RELEASE ORAL
Qty: 90 TABLET | Refills: 3 | Status: SHIPPED | OUTPATIENT
Start: 2020-12-08 | End: 2022-01-28

## 2020-12-08 RX ORDER — LISINOPRIL 30 MG/1
30 TABLET ORAL DAILY
Qty: 90 TABLET | Refills: 3 | Status: SHIPPED | OUTPATIENT
Start: 2020-12-08 | End: 2022-02-09

## 2020-12-08 RX ORDER — DULOXETIN HYDROCHLORIDE 60 MG/1
60 CAPSULE, DELAYED RELEASE ORAL DAILY
Qty: 90 CAPSULE | Refills: 3 | Status: SHIPPED | OUTPATIENT
Start: 2020-12-08 | End: 2021-09-17

## 2020-12-08 ASSESSMENT — ENCOUNTER SYMPTOMS
CONSTIPATION: 0
COUGH: 0
NAUSEA: 0
ABDOMINAL PAIN: 0
BLOOD IN STOOL: 0
GASTROINTESTINAL NEGATIVE: 1
VOMITING: 0
DIARRHEA: 0
SORE THROAT: 0
CHEST TIGHTNESS: 0
WHEEZING: 0
SHORTNESS OF BREATH: 0

## 2020-12-08 NOTE — PROGRESS NOTES
2020     Zaynab Beard (:  1945) is a 70 y.o. female, here for evaluation of the following medical concerns:    Chief Complaint   Patient presents with    Other     refills for meds        Insomnia:    Current treatment: prescription sleep aid- Ambien- 6.25mg nightly, which has been effective. Medication side effects: none. Patient states she has been taking this for many years and it works great. She is able to fall asleep and stay asleep. Gastroesophageal Reflux   She reports no abdominal pain, no chest pain, no coughing, no nausea, no sore throat or no wheezing. This is a chronic problem. The current episode started more than 1 year ago. The problem occurs rarely. The problem has been resolved. Nothing aggravates the symptoms. Pertinent negatives include no anemia, fatigue, melena or weight loss. There are no known risk factors. She has tried a PPI for the symptoms. The treatment provided significant relief. Past procedures do not include an abdominal ultrasound. Past invasive treatments do not include gastroplasty or reflux surgery. Hypertension:  Home blood pressure monitoring: Yes - once per week. Is usually within normal limits. She is not adherent to a low sodium diet. Patient denies chest pain, shortness of breath, lightheadedness, blurred vision, peripheral edema, palpitations, dry cough and fatigue. Antihypertensive medication side effects: no medication side effects noted. Use of agents associated with hypertension: none. Sodium (mmol/L)   Date Value   2020 138    BUN (mg/dL)   Date Value   2020 20    Glucose (mg/dL)   Date Value   2020 110 (H)   2012 103 (H)      Potassium (mmol/L)   Date Value   2020 4.5    CREATININE (mg/dL)   Date Value   2020 0.6           Review of Systems   Constitutional: Negative for chills, diaphoresis, fatigue, fever and weight loss.    HENT: Negative for sore unspecified type  Stable/continue ambien    2. Essential hypertension  Stable/continue  - lisinopril (ZESTRIL) 30 MG tablet; Take 1 tablet by mouth daily  Dispense: 90 tablet; Refill: 3    3. Gastroesophageal reflux disease, unspecified whether esophagitis present  Stable/continue  - pantoprazole (PROTONIX) 40 MG tablet; Take 1 tablet by mouth every morning (before breakfast)  Dispense: 90 tablet; Refill: 3    4. Chronic bilateral low back pain without sciatica  Stable/continue  - DULoxetine (CYMBALTA) 60 MG extended release capsule; Take 1 capsule by mouth daily  Dispense: 90 capsule; Refill: 3    Return in about 1 year (around 12/8/2021), or if symptoms worsen or fail to improve.

## 2020-12-08 NOTE — PATIENT INSTRUCTIONS
A serving is 1 slice of bread, 1 ounce of dry cereal, or ½ cup of cooked rice, pasta, or cooked cereal. Try to choose whole-grain products as much as possible. · Limit lean meat, poultry, and fish to 2 servings each day. A serving is 3 ounces, about the size of a deck of cards. · Eat 4 to 5 servings of nuts, seeds, and legumes (cooked dried beans, lentils, and split peas) each week. A serving is 1/3 cup of nuts, 2 tablespoons of seeds, or ½ cup of cooked beans or peas. · Limit fats and oils to 2 to 3 servings each day. A serving is 1 teaspoon of vegetable oil or 2 tablespoons of salad dressing. · Limit sweets and added sugars to 5 servings or less a week. A serving is 1 tablespoon jelly or jam, ½ cup sorbet, or 1 cup of lemonade. · Eat less than 2,300 milligrams (mg) of sodium a day. If you limit your sodium to 1,500 mg a day, you can lower your blood pressure even more. Tips for success  · Start small. Do not try to make dramatic changes to your diet all at once. You might feel that you are missing out on your favorite foods and then be more likely to not follow the plan. Make small changes, and stick with them. Once those changes become habit, add a few more changes. · Try some of the following:  ? Make it a goal to eat a fruit or vegetable at every meal and at snacks. This will make it easy to get the recommended amount of fruits and vegetables each day. ? Try yogurt topped with fruit and nuts for a snack or healthy dessert. ? Add lettuce, tomato, cucumber, and onion to sandwiches. ? Combine a ready-made pizza crust with low-fat mozzarella cheese and lots of vegetable toppings. Try using tomatoes, squash, spinach, broccoli, carrots, cauliflower, and onions. ? Have a variety of cut-up vegetables with a low-fat dip as an appetizer instead of chips and dip. ? Sprinkle sunflower seeds or chopped almonds over salads. Or try adding chopped walnuts or almonds to cooked vegetables.   ? Try some vegetarian meals using beans and peas. Add garbanzo or kidney beans to salads. Make burritos and tacos with mashed liu beans or black beans. Where can you learn more? Go to https://flory.FlagTap. org and sign in to your [employer] account. Enter J428 in the [facility] box to learn more about \"DASH Diet: Care Instructions. \"     If you do not have an account, please click on the \"Sign Up Now\" link. Current as of: December 16, 2019               Content Version: 12.6  © 1504-2153 [employer]. Care instructions adapted under license by [facility] ([facility]). If you have questions about a medical condition or this instruction, always ask your healthcare professional. [address] any warranty or liability for your use of this information. Patient Education        High Blood Pressure: Care Instructions  Overview     It's normal for blood pressure to go up and down throughout the day. But if it stays up, you have high blood pressure. Another name for high blood pressure is hypertension. Despite what a lot of people think, high blood pressure usually doesn't cause headaches or make you feel dizzy or lightheaded. It usually has no symptoms. But it does increase your risk of stroke, heart attack, and other problems. You and your doctor will talk about your risks of these problems based on your blood pressure. Your doctor will give you a goal for your blood pressure. Your goal will be based on your health and your age. Lifestyle changes, such as eating healthy and being active, are always important to help lower blood pressure. You might also take medicine to reach your blood pressure goal.  Follow-up care is a key part of your treatment and safety. Be sure to make and go to all appointments, and call your doctor if you are having problems. It's also a good idea to know your test results and keep a list of the medicines you take.   How can you care for yourself at home?  Medical treatment  · If you stop taking your medicine, your blood pressure will go back up. You may take one or more types of medicine to lower your blood pressure. Be safe with medicines. Take your medicine exactly as prescribed. Call your doctor if you think you are having a problem with your medicine. · Talk to your doctor before you start taking aspirin every day. Aspirin can help certain people lower their risk of a heart attack or stroke. But taking aspirin isn't right for everyone, because it can cause serious bleeding. · See your doctor regularly. You may need to see the doctor more often at first or until your blood pressure comes down. · If you are taking blood pressure medicine, talk to your doctor before you take decongestants or anti-inflammatory medicine, such as ibuprofen. Some of these medicines can raise blood pressure. · Learn how to check your blood pressure at home. Lifestyle changes  · Stay at a healthy weight. This is especially important if you put on weight around the waist. Losing even 10 pounds can help you lower your blood pressure. · If your doctor recommends it, get more exercise. Walking is a good choice. Bit by bit, increase the amount you walk every day. Try for at least 30 minutes on most days of the week. You also may want to swim, bike, or do other activities. · Avoid or limit alcohol. Talk to your doctor about whether you can drink any alcohol. · Try to limit how much sodium you eat to less than 2,300 milligrams (mg) a day. Your doctor may ask you to try to eat less than 1,500 mg a day. · Eat plenty of fruits (such as bananas and oranges), vegetables, legumes, whole grains, and low-fat dairy products. · Lower the amount of saturated fat in your diet. Saturated fat is found in animal products such as milk, cheese, and meat. Limiting these foods may help you lose weight and also lower your risk for heart disease. · Do not smoke.  Smoking increases your risk for heart attack and stroke. If you need help quitting, talk to your doctor about stop-smoking programs and medicines. These can increase your chances of quitting for good. When should you call for help? Call  911 anytime you think you may need emergency care. This may mean having symptoms that suggest that your blood pressure is causing a serious heart or blood vessel problem. Your blood pressure may be over 180/120. For example, call 911 if:    · You have symptoms of a heart attack. These may include:  ? Chest pain or pressure, or a strange feeling in the chest.  ? Sweating. ? Shortness of breath. ? Nausea or vomiting. ? Pain, pressure, or a strange feeling in the back, neck, jaw, or upper belly or in one or both shoulders or arms. ? Lightheadedness or sudden weakness. ? A fast or irregular heartbeat.     · You have symptoms of a stroke. These may include:  ? Sudden numbness, tingling, weakness, or loss of movement in your face, arm, or leg, especially on only one side of your body. ? Sudden vision changes. ? Sudden trouble speaking. ? Sudden confusion or trouble understanding simple statements. ? Sudden problems with walking or balance. ? A sudden, severe headache that is different from past headaches.     · You have severe back or belly pain. Do not wait until your blood pressure comes down on its own. Get help right away. Call your doctor now or seek immediate care if:    · Your blood pressure is much higher than normal (such as 180/120 or higher), but you don't have symptoms.     · You think high blood pressure is causing symptoms, such as:  ? Severe headache.  ? Blurry vision. Watch closely for changes in your health, and be sure to contact your doctor if:    · Your blood pressure measures higher than your doctor recommends at least 2 times.  That means the top number is higher or the bottom number is higher, or both.     · You think you may be having side effects from your blood pressure medicine. Where can you learn more? Go to https://chpepiceweb.[email or web address] org and sign in to your [employer] account. Enter Y946 in the [employer] box to learn more about \"High Blood Pressure: Care Instructions. \"     If you do not have an account, please click on the \"Sign Up Now\" link. Current as of: December 16, 2019               Content Version: 12.6  © 2006-2020 [employer]. Care instructions adapted under license by [facility] ([facility] If you have questions about a medical condition or this instruction, always ask your healthcare professional. [place] [address] any warranty or liability for your use of this information. Patient Education        Insomnia: Care Instructions  Your Care Instructions     Insomnia is the inability to sleep well. It is a common problem for most people at some time. Insomnia may make it hard for you to get to sleep, stay asleep, or sleep as long as you need to. This can make you tired and grouchy during the day. It can also make you forgetful, less effective at work, and unhappy. Insomnia can be caused by conditions such as depression or anxiety. Pain can also affect your ability to sleep. When these problems are solved, the insomnia usually clears up. But sometimes bad sleep habits can cause insomnia. If insomnia is affecting your work or your enjoyment of life, you can take steps to improve your sleep. Follow-up care is a key part of your treatment and safety. Be sure to make and go to all appointments, and call your doctor if you are having problems. It's also a good idea to know your test results and keep a list of the medicines you take. How can you care for yourself at home? What to avoid   · Do not have drinks with caffeine, such as coffee or black tea, for 8 hours before bed. · Do not smoke or use other types of tobacco near bedtime. Nicotine is a stimulant and can keep you awake.   · Avoid drinking alcohol late in the evening, because it can cause you to wake in the middle of the night. · Do not eat a big meal close to bedtime. If you are hungry, eat a light snack. · Do not drink a lot of water close to bedtime, because the need to urinate may wake you up during the night. · Do not read or watch TV in bed. Use the bed only for sleeping and sexual activity. What to try   · Go to bed at the same time every night, and wake up at the same time every morning. Do not take naps during the day. · Keep your bedroom quiet, dark, and cool. · Sleep on a comfortable pillow and mattress. · If watching the clock makes you anxious, turn it facing away from you so you cannot see the time. · If you worry when you lie down, start a worry book. Well before bedtime, write down your worries, and then set the book and your concerns aside. · Try meditation or other relaxation techniques before you go to bed. · If you cannot fall asleep, get up and go to another room until you feel sleepy. Do something relaxing. Repeat your bedtime routine before you go to bed again. · Make your house quiet and calm about an hour before bedtime. Turn down the lights, turn off the TV, log off the computer, and turn down the volume on music. This can help you relax after a busy day. When should you call for help? Watch closely for changes in your health, and be sure to contact your doctor if:    · Your efforts to improve your sleep do not work.     · Your insomnia gets worse.     · You have been feeling down, depressed, or hopeless or have lost interest in things that you usually enjoy. Where can you learn more? Go to https://Hemospherepepiceweb.[email or web address] org and sign in to your [employer] account. Enter P513 in the [employer] box to learn more about \"Insomnia: Care Instructions. \"     If you do not have an account, please click on the \"Sign Up Now\" link.   Current as of: December 16, 2019               Content Version: 12.6  © 6705-2238 [employer]. Care instructions adapted under license by [facility] ([facility]). If you have questions about a medical condition or this instruction, always ask your healthcare professional. [address] any warranty or liability for your use of this information.

## 2020-12-15 RX ORDER — ZOLPIDEM TARTRATE 6.25 MG/1
TABLET, FILM COATED, EXTENDED RELEASE ORAL
Qty: 90 TABLET | Refills: 0 | Status: SHIPPED | OUTPATIENT
Start: 2020-12-15 | End: 2021-03-03 | Stop reason: SDUPTHER

## 2021-01-13 ENCOUNTER — HOSPITAL ENCOUNTER (OUTPATIENT)
Dept: MAMMOGRAPHY | Age: 76
Discharge: HOME | End: 2021-01-13
Payer: MEDICARE

## 2021-01-13 DIAGNOSIS — Z12.31 ENCOUNTER FOR SCREENING MAMMOGRAM FOR BREAST CANCER: ICD-10-CM

## 2021-01-13 PROCEDURE — 77063 BREAST TOMOSYNTHESIS BI: CPT

## 2021-03-03 DIAGNOSIS — G47.00 INSOMNIA, UNSPECIFIED TYPE: ICD-10-CM

## 2021-03-03 RX ORDER — ZOLPIDEM TARTRATE 6.25 MG/1
TABLET, FILM COATED, EXTENDED RELEASE ORAL
Qty: 90 TABLET | Refills: 0 | Status: SHIPPED | OUTPATIENT
Start: 2021-03-03 | End: 2021-05-25 | Stop reason: SDUPTHER

## 2021-03-03 NOTE — TELEPHONE ENCOUNTER
Medication and Quantity requested: Zolpidem 6.25mg, 90 day supply    Last Visit  12-8-20,Irma joshi CNP    Pharmacy and phone number updated in EPIC:  Yes,[facility]

## 2021-03-03 NOTE — TELEPHONE ENCOUNTER
Medication:   Requested Prescriptions     Pending Prescriptions Disp Refills    zolpidem (AMBIEN CR) 6.25 MG extended release tablet 90 tablet 0     Sig: TAKE 1 TABLET EVERY NIGHT AT BEDTIME AS NEEDED        Last Filled:      Patient Phone Number: [phone] (home)     Last appt: 12/8/2020   Next appt: Visit date not found    Last OARRS:   RX Monitoring 8/18/2020   Attestation -   Periodic Controlled Substance Monitoring No signs of potential drug abuse or diversion identified.

## 2021-03-08 ENCOUNTER — TELEPHONE (OUTPATIENT)
Dept: FAMILY MEDICINE CLINIC | Age: 76
End: 2021-03-08

## 2021-03-08 DIAGNOSIS — I10 ESSENTIAL HYPERTENSION: Primary | ICD-10-CM

## 2021-03-23 RX ORDER — AMMONIUM LACTATE 12 G/100G
CREAM TOPICAL
Qty: 1 TUBE | Refills: 4 | Status: SHIPPED | OUTPATIENT
Start: 2021-03-23 | End: 2022-04-01

## 2021-03-23 NOTE — TELEPHONE ENCOUNTER
Medication:   Requested Prescriptions     Pending Prescriptions Disp Refills    ammonium lactate (AMLACTIN) 12 % cream [Pharmacy Med Name: AMMONIUM LACTATE 12% CREAM]  3     Sig: APPLY TO AFFECTED AREA(S) DAILY AS NEEDED        Last Filled:  4/9/19 1 tube, 4 RF     Patient Phone Number: [phone] (home)     Last appt: 12/8/20 insomnia   Next appt: Visit date not found

## 2021-03-29 DIAGNOSIS — M54.50 CHRONIC BILATERAL LOW BACK PAIN WITHOUT SCIATICA: ICD-10-CM

## 2021-03-29 DIAGNOSIS — G89.29 CHRONIC BILATERAL LOW BACK PAIN WITHOUT SCIATICA: ICD-10-CM

## 2021-03-29 RX ORDER — DULOXETIN HYDROCHLORIDE 30 MG/1
CAPSULE, DELAYED RELEASE ORAL
Qty: 90 CAPSULE | Refills: 1 | Status: SHIPPED | OUTPATIENT
Start: 2021-03-29 | End: 2021-09-17 | Stop reason: SDUPTHER

## 2021-03-29 NOTE — TELEPHONE ENCOUNTER
Medication and Quantity requested: DULoxetine (CYMBALTA) 40 MG extended release capsule        Last Visit  12/8/20    Pharmacy and phone number updated in EPIC:  Yes  [facility]    Pt would like to reduce her dosage from 60mg to 40mg    Please call pt and advise if Dr Dulce Squires is ok with the decrease in doasge

## 2021-05-25 DIAGNOSIS — G47.00 INSOMNIA, UNSPECIFIED TYPE: ICD-10-CM

## 2021-05-25 RX ORDER — ZOLPIDEM TARTRATE 6.25 MG/1
TABLET, FILM COATED, EXTENDED RELEASE ORAL
Qty: 90 TABLET | Refills: 0 | Status: SHIPPED | OUTPATIENT
Start: 2021-05-25 | End: 2021-06-03 | Stop reason: SDUPTHER

## 2021-05-25 NOTE — TELEPHONE ENCOUNTER
Medication:   Requested Prescriptions     Pending Prescriptions Disp Refills    zolpidem (AMBIEN CR) 6.25 MG extended release tablet 90 tablet 0     Sig: TAKE 1 TABLET EVERY NIGHT AT BEDTIME AS NEEDED        Last Filled:      Patient Phone Number: [phone] (home)     Last appt: 12/8/2020   Next appt: 8/20/2021    Last OARRS:   RX Monitoring 3/3/2021   Attestation -   Periodic Controlled Substance Monitoring No signs of potential drug abuse or diversion identified.

## 2021-05-25 NOTE — TELEPHONE ENCOUNTER
Medication and Quantity requested: zolpidem (AMBIEN CR) 6.25 MG extended release tablet-QTY.  90 tablets         Last Visit  12/8/2020    Pharmacy and phone number updated in EPIC:  yes

## 2021-06-03 DIAGNOSIS — G47.00 INSOMNIA, UNSPECIFIED TYPE: ICD-10-CM

## 2021-06-03 RX ORDER — ZOLPIDEM TARTRATE 6.25 MG/1
TABLET, FILM COATED, EXTENDED RELEASE ORAL
Qty: 90 TABLET | Refills: 0 | Status: SHIPPED | OUTPATIENT
Start: 2021-06-03 | End: 2021-08-23

## 2021-06-03 NOTE — TELEPHONE ENCOUNTER
[facility] wouldn't send zolpidem (AMBIEN CR) 6.25 MG extended release tablet . Pt wants to know if it can be cancelled from them and [facility] [address] [facility] . Pt wants to know why [facility] won't [facility] the medication is a script was sent and wants somebody to call and ask as well.

## 2021-06-04 ENCOUNTER — TELEPHONE (OUTPATIENT)
Dept: FAMILY MEDICINE CLINIC | Age: 76
End: 2021-06-04

## 2021-06-04 NOTE — TELEPHONE ENCOUNTER
Office has been notified that pt is requiring Prior Authorization for the following medication:  -zolpidem (AMBIEN CR) 6.25 MG extended release tablet    -     Please initiate this request through CoverMyMeds, contacting the following Payor/Insurance:  -[employer]-     Please see below, or the documentation attached to this encounter for any additional information that may assist in processing PA:  -Insomnia, unspecified type (G47.00-     Thank you!

## 2021-06-04 NOTE — TELEPHONE ENCOUNTER
Submitted PA for Zolpidem Tartrate ER 6.25MG er tablets  Key: [id number] - PA Case ID: [id number]   Via CMM STATUS: Approved 05/05/2021; Coverage End Date:06/04/2022; Amy Ji Please notify patient, thank you.

## 2021-07-06 DIAGNOSIS — I10 ESSENTIAL HYPERTENSION: ICD-10-CM

## 2021-07-06 LAB
A/G RATIO: 2.3 (ref 1.1–2.2)
ALBUMIN SERPL-MCNC: 4.3 G/DL (ref 3.4–5)
ALP BLD-CCNC: 66 U/L (ref 40–129)
ALT SERPL-CCNC: 13 U/L (ref 10–40)
ANION GAP SERPL CALCULATED.3IONS-SCNC: 11 MMOL/L (ref 3–16)
AST SERPL-CCNC: 19 U/L (ref 15–37)
BASOPHILS ABSOLUTE: 0.1 K/UL (ref 0–0.2)
BASOPHILS RELATIVE PERCENT: 1.7 %
BILIRUB SERPL-MCNC: 0.6 MG/DL (ref 0–1)
BUN BLDV-MCNC: 21 MG/DL (ref 7–20)
CALCIUM SERPL-MCNC: 9.7 MG/DL (ref 8.3–10.6)
CHLORIDE BLD-SCNC: 104 MMOL/L (ref 99–110)
CHOLESTEROL, TOTAL: 210 MG/DL (ref 0–199)
CO2: 25 MMOL/L (ref 21–32)
CREAT SERPL-MCNC: 0.6 MG/DL (ref 0.6–1.2)
EOSINOPHILS ABSOLUTE: 0.2 K/UL (ref 0–0.6)
EOSINOPHILS RELATIVE PERCENT: 3.7 %
GFR AFRICAN AMERICAN: >60
GFR NON-AFRICAN AMERICAN: >60
GLOBULIN: 1.9 G/DL
GLUCOSE BLD-MCNC: 101 MG/DL (ref 70–99)
HCT VFR BLD CALC: 39.6 % (ref 36–48)
HDLC SERPL-MCNC: 78 MG/DL (ref 40–60)
HEMOGLOBIN: 13.1 G/DL (ref 12–16)
LDL CHOLESTEROL CALCULATED: 119 MG/DL
LYMPHOCYTES ABSOLUTE: 1.8 K/UL (ref 1–5.1)
LYMPHOCYTES RELATIVE PERCENT: 41.6 %
MCH RBC QN AUTO: 32.5 PG (ref 26–34)
MCHC RBC AUTO-ENTMCNC: 33.1 G/DL (ref 31–36)
MCV RBC AUTO: 98.1 FL (ref 80–100)
MONOCYTES ABSOLUTE: 0.4 K/UL (ref 0–1.3)
MONOCYTES RELATIVE PERCENT: 8.6 %
NEUTROPHILS ABSOLUTE: 1.9 K/UL (ref 1.7–7.7)
NEUTROPHILS RELATIVE PERCENT: 44.4 %
PDW BLD-RTO: 13.6 % (ref 12.4–15.4)
PLATELET # BLD: 241 K/UL (ref 135–450)
PMV BLD AUTO: 9.1 FL (ref 5–10.5)
POTASSIUM SERPL-SCNC: 4.7 MMOL/L (ref 3.5–5.1)
RBC # BLD: 4.04 M/UL (ref 4–5.2)
SODIUM BLD-SCNC: 140 MMOL/L (ref 136–145)
TOTAL PROTEIN: 6.2 G/DL (ref 6.4–8.2)
TRIGL SERPL-MCNC: 67 MG/DL (ref 0–150)
TSH REFLEX: 0.91 UIU/ML (ref 0.27–4.2)
VLDLC SERPL CALC-MCNC: 13 MG/DL
WBC # BLD: 4.3 K/UL (ref 4–11)

## 2021-07-08 ENCOUNTER — TELEPHONE (OUTPATIENT)
Dept: FAMILY MEDICINE CLINIC | Age: 76
End: 2021-07-08

## 2021-07-08 NOTE — TELEPHONE ENCOUNTER
Pt. Called about getting an MMSE test. Her and Dr. Francoise Tapia have already discussed it needing to be done, he wants her to get in before August. Her  has an appointment on the 19th of this month at 6 am, and she was wanting to come in that day. Looking at Dr. Lenora Betancourt schedule it shows him unavailable that day, but she was wondering if an MA would be able to take her to do the test instead of making an office visit appointment with Dr. Francoise Tapia. Please call and notify her if possible.

## 2021-07-08 NOTE — TELEPHONE ENCOUNTER
She should have an appointment with me. It is more than just doing the MMSE. If she would like we can fit her in tomorrow or sometime next week.

## 2021-07-13 ENCOUNTER — OFFICE VISIT (OUTPATIENT)
Dept: FAMILY MEDICINE CLINIC | Age: 76
End: 2021-07-13
Payer: MEDICARE

## 2021-07-13 VITALS
OXYGEN SATURATION: 98 % | DIASTOLIC BLOOD PRESSURE: 64 MMHG | SYSTOLIC BLOOD PRESSURE: 112 MMHG | BODY MASS INDEX: 25.65 KG/M2 | HEART RATE: 61 BPM | WEIGHT: 151.8 LBS

## 2021-07-13 DIAGNOSIS — R41.3 MEMORY DISORDER: Primary | ICD-10-CM

## 2021-07-13 DIAGNOSIS — C18.4 MALIGNANT NEOPLASM OF TRANSVERSE COLON (HCC): ICD-10-CM

## 2021-07-13 DIAGNOSIS — G47.00 INSOMNIA, UNSPECIFIED TYPE: ICD-10-CM

## 2021-07-13 PROCEDURE — 99214 OFFICE O/P EST MOD 30 MIN: CPT | Performed by: FAMILY MEDICINE

## 2021-07-13 RX ORDER — MEMANTINE HYDROCHLORIDE 10 MG/1
10 TABLET ORAL 2 TIMES DAILY
Qty: 180 TABLET | Refills: 1 | Status: SHIPPED | OUTPATIENT
Start: 2021-07-13 | End: 2022-08-22

## 2021-07-13 SDOH — ECONOMIC STABILITY: FOOD INSECURITY: WITHIN THE PAST 12 MONTHS, THE FOOD YOU BOUGHT JUST DIDN'T LAST AND YOU DIDN'T HAVE MONEY TO GET MORE.: NEVER TRUE

## 2021-07-13 SDOH — ECONOMIC STABILITY: TRANSPORTATION INSECURITY
IN THE PAST 12 MONTHS, HAS LACK OF TRANSPORTATION KEPT YOU FROM MEETINGS, WORK, OR FROM GETTING THINGS NEEDED FOR DAILY LIVING?: NO

## 2021-07-13 SDOH — ECONOMIC STABILITY: TRANSPORTATION INSECURITY
IN THE PAST 12 MONTHS, HAS THE LACK OF TRANSPORTATION KEPT YOU FROM MEDICAL APPOINTMENTS OR FROM GETTING MEDICATIONS?: NO

## 2021-07-13 SDOH — ECONOMIC STABILITY: FOOD INSECURITY: WITHIN THE PAST 12 MONTHS, YOU WORRIED THAT YOUR FOOD WOULD RUN OUT BEFORE YOU GOT MONEY TO BUY MORE.: NEVER TRUE

## 2021-07-13 ASSESSMENT — PATIENT HEALTH QUESTIONNAIRE - PHQ9
2. FEELING DOWN, DEPRESSED OR HOPELESS: 0
SUM OF ALL RESPONSES TO PHQ QUESTIONS 1-9: 0
1. LITTLE INTEREST OR PLEASURE IN DOING THINGS: 0
SUM OF ALL RESPONSES TO PHQ9 QUESTIONS 1 & 2: 0

## 2021-07-13 ASSESSMENT — ENCOUNTER SYMPTOMS
GASTROINTESTINAL NEGATIVE: 1
RESPIRATORY NEGATIVE: 1

## 2021-07-13 ASSESSMENT — SOCIAL DETERMINANTS OF HEALTH (SDOH): HOW HARD IS IT FOR YOU TO PAY FOR THE VERY BASICS LIKE FOOD, HOUSING, MEDICAL CARE, AND HEATING?: NOT HARD AT ALL

## 2021-07-13 NOTE — PROGRESS NOTES
Sonia Irene (:  1945) is a 70 y.o. female,Established patient, here for evaluation of the following chief complaint(s):  Memory Loss (short term) and Migraine (only on L side )         ASSESSMENT/PLAN:  1. Memory disorder  Initiate  -     memantine (NAMENDA) 10 MG tablet; Take 1 tablet by mouth 2 times daily, Disp-180 tablet, R-1Normal  Return 1 month for AWV and follow-up of Namenda. MMSE 29/30  2. Malignant neoplasm of transverse colon ([facility] [address]  -     COLONOSCOPY (Screening); Future  3. Insomnia, unspecified type  OARRS report reviewed and no inconsistencies noted   The patient understands the risks of dependency/addiction with zolpidem and will take as little as possible and discontinue as soon as possible       Return in about 1 month (around 2021) for annual physical exam.         Subjective   SUBJECTIVE/OBJECTIVE:  HPI   Memory disorder: She states that while driving sometimes she has to think where she is going. She states this has been going on for a while. She states she does not really have any other significant issues. Apparently reviewing her record she has tried donepezil in the past which she thinks caused diarrhea. We then tried her on Namenda which she does not remember taking and also does not remember why she stopped it. After discussion of the reason for taking medication i.e. to slow down any memory disorder process she would like to try one of the medications again. We will also do an MMSE on her. Neoplasm of the colon: She states she does not remember when she is due for her next colonoscopy. Review of the records reveal that she was due in March of this year. She will make an appointment to get that done. Insomnia/OARRS check: She continues to need zolpidem for sleep. She does understand the risks of both being female and taking zolpidem and being 75. She states she has not had an issue with this and does not sleep if she does not take the medication.   Review of Systems   Constitutional: Negative. HENT: Negative. Respiratory: Negative. Cardiovascular: Negative. Gastrointestinal: Negative. Endocrine: Negative. Genitourinary: Negative. Musculoskeletal: Positive for arthralgias and myalgias. Neurological: Positive for headaches ( usually daily in L temple area. May take aleve which helps). Some balance issues periodically although does Pilates   Psychiatric/Behavioral: Positive for sleep disturbance. Objective   Physical Exam  Constitutional:       General: She is not in acute distress. Neck:      Thyroid: No thyromegaly. Vascular: No carotid bruit. Cardiovascular:      Rate and Rhythm: Normal rate and regular rhythm. Pulmonary:      Effort: Pulmonary effort is normal.      Breath sounds: Normal breath sounds. No wheezing or rales. Musculoskeletal:      Cervical back: Neck supple. Lymphadenopathy:      Cervical: No cervical adenopathy. Skin:     General: Skin is warm and dry. Neurological:      Mental Status: She is alert and oriented to person, place, and time. Psychiatric:         Behavior: Behavior normal.         Thought Content: Thought content normal.         Judgment: Judgment normal.                  An electronic signature was used to authenticate this note.     --Deb Thomas MD

## 2021-07-22 ENCOUNTER — TELEPHONE (OUTPATIENT)
Dept: FAMILY MEDICINE CLINIC | Age: 76
End: 2021-07-22

## 2021-07-22 NOTE — TELEPHONE ENCOUNTER
Please see previous note. Patient believes she is having a bad reaction to her medications and would like a call today.

## 2021-07-22 NOTE — TELEPHONE ENCOUNTER
She is talking about the Namenda, what is a reaction? Printed in her allergy list as an intolerance. Just have her stop the medication.

## 2021-07-22 NOTE — TELEPHONE ENCOUNTER
----- Message from Live Hernandez sent at 7/22/2021 12:06 PM EDT -----  Subject: Message to Provider    QUESTIONS  Information for Provider? PT is having a bad reaction to medications that   was given to her, she don't know if it the medication or her sinus   infections. Please call PT   ---------------------------------------------------------------------------  --------------  CALL BACK INFO  What is the best way for the office to contact you? OK to leave message on   voicemail  Preferred Call Back Phone Number? [phone]  ---------------------------------------------------------------------------  --------------  SCRIPT ANSWERS  Relationship to Patient?  Self

## 2021-08-10 ENCOUNTER — TELEPHONE (OUTPATIENT)
Dept: FAMILY MEDICINE CLINIC | Age: 76
End: 2021-08-10

## 2021-08-10 NOTE — TELEPHONE ENCOUNTER
Patient has been referred to  [address] [place] [address] Drive She is requesting copies of her CT SCAN that was done back in 2011. I printed the results off for CT scan . She states that they need any office notes or reports pertaining that she might have ALZEHMEIER'S. Please call [phone].

## 2021-08-10 NOTE — TELEPHONE ENCOUNTER
Called and spoke with pt let her know that the MRI was printed and ready for her to  today.   Pt states that she will be in to

## 2021-08-10 NOTE — TELEPHONE ENCOUNTER
If we have done an MMSE, send that. Send lab work or any other diagnostic neurological tests. You could check the last few visits and see if we addressed any memory issues there and if so send that.

## 2021-08-20 ENCOUNTER — OFFICE VISIT (OUTPATIENT)
Dept: FAMILY MEDICINE CLINIC | Age: 76
End: 2021-08-20
Payer: MEDICARE

## 2021-08-20 VITALS
SYSTOLIC BLOOD PRESSURE: 122 MMHG | HEIGHT: 65 IN | DIASTOLIC BLOOD PRESSURE: 64 MMHG | WEIGHT: 152.38 LBS | BODY MASS INDEX: 25.39 KG/M2 | OXYGEN SATURATION: 96 % | HEART RATE: 62 BPM

## 2021-08-20 DIAGNOSIS — K21.9 GASTROESOPHAGEAL REFLUX DISEASE, UNSPECIFIED WHETHER ESOPHAGITIS PRESENT: ICD-10-CM

## 2021-08-20 DIAGNOSIS — C18.4 MALIGNANT NEOPLASM OF TRANSVERSE COLON (HCC): ICD-10-CM

## 2021-08-20 DIAGNOSIS — R41.3 MEMORY DISORDER: ICD-10-CM

## 2021-08-20 DIAGNOSIS — Z00.00 ROUTINE GENERAL MEDICAL EXAMINATION AT A HEALTH CARE FACILITY: ICD-10-CM

## 2021-08-20 DIAGNOSIS — Z00.00 MEDICARE ANNUAL WELLNESS VISIT, SUBSEQUENT: Primary | ICD-10-CM

## 2021-08-20 DIAGNOSIS — I10 ESSENTIAL HYPERTENSION: ICD-10-CM

## 2021-08-20 DIAGNOSIS — G47.00 INSOMNIA, UNSPECIFIED TYPE: ICD-10-CM

## 2021-08-20 PROCEDURE — G0439 PPPS, SUBSEQ VISIT: HCPCS | Performed by: FAMILY MEDICINE

## 2021-08-20 ASSESSMENT — PATIENT HEALTH QUESTIONNAIRE - PHQ9
SUM OF ALL RESPONSES TO PHQ QUESTIONS 1-9: 0
SUM OF ALL RESPONSES TO PHQ QUESTIONS 1-9: 0
1. LITTLE INTEREST OR PLEASURE IN DOING THINGS: 0
SUM OF ALL RESPONSES TO PHQ QUESTIONS 1-9: 0
SUM OF ALL RESPONSES TO PHQ9 QUESTIONS 1 & 2: 0
2. FEELING DOWN, DEPRESSED OR HOPELESS: 0

## 2021-08-20 ASSESSMENT — LIFESTYLE VARIABLES: HOW OFTEN DO YOU HAVE A DRINK CONTAINING ALCOHOL: 0

## 2021-08-20 NOTE — PROGRESS NOTES
Medicare Annual Wellness Visit  Name: Alexandro Harper Date: 2021   MRN: [id number] Sex: Female   Age: 70 y.o. Ethnicity: Non- / Non    : 1945 Race: White (non-)      Malorie Nagel is here for Medicare AWV    Screenings for behavioral, psychosocial and functional/safety risks, and cognitive dysfunction are all negative except as indicated below. These results, as well as other patient data from the [address] form, are documented in Flowsheets linked to this Encounter. No Known Allergies      Prior to Visit Medications    Medication Sig Taking?  Authorizing Provider   memantine (NAMENDA) 10 MG tablet Take 1 tablet by mouth 2 times daily Yes Yohan Dempsey MD   DULoxetine (CYMBALTA) 30 MG extended release capsule TAKE 1 CAPSULE DAILY Yes Yohan Dempsey MD   ammonium lactate (AMLACTIN) 12 % cream APPLY TO AFFECTED AREA(S) DAILY AS NEEDED Yes Yohan Dempsey MD   lisinopril (ZESTRIL) 30 MG tablet Take 1 tablet by mouth daily Yes GIANNA Pablo CNP   DULoxetine (CYMBALTA) 60 MG extended release capsule Take 1 capsule by mouth daily Yes GIANNA Pablo CNP   pantoprazole (PROTONIX) 40 MG tablet Take 1 tablet by mouth every morning (before breakfast) Yes GIANNA Pablo CNP   valACYclovir (VALTREX) 500 MG tablet TAKE TWO TABLETS BY MOUTH NOW, THEN TAKE TWO TABLET BY MOUTH IN 12 HOURS Yes Yohan Dempsey MD   Cholecalciferol (VITAMIN D) 2000 UNITS CAPS capsule Take 1 capsule by mouth daily  Yes Historical Provider, MD   zolpidem (AMBIEN CR) 6.25 MG extended release tablet TAKE 1 TABLET EVERY NIGHT AT BEDTIME AS NEEDED  Yohan Dempsey MD         Past Medical History:   Diagnosis Date    Esophagus, Khanna's     Essential hypertension 2016    GERD (gastroesophageal reflux disease)     HH (hiatus hernia) 11    small    IBS (irritable bowel syndrome)     Insomnia     Memory disorder 2016    TMJ (temporomandibular joint disorder) Past Surgical History:   Procedure Laterality Date    COLON SURGERY  04/10/2017    transverse colectomy - Dr. García Cano COLONOSCOPY  2000,2003,2006,11,3/5/18    5 years: moderate pan colonic diverticulosis, transverse colon anastomosis, Dr Cecil Tucker Right 11/2017    SINUS SURGERY  2009    TONSILLECTOMY      TOTAL HIP ARTHROPLASTY Left     2016    UPPER GASTROINTESTINAL ENDOSCOPY  2004,2006    UPPER GASTROINTESTINAL ENDOSCOPY  07/25/11    with biopsy, and with esophageal balloon dilation    UPPER GASTROINTESTINAL ENDOSCOPY N/A 11/30/2018    EGD DILATION BALLOON performed by Joannie Osler, MD at [address] [place] [address]         Family History   Problem Relation Age of Onset    Colon Cancer Mother     Other Father         diverticulitis    Hypertension Father        CareTeam (Including outside providers/suppliers regularly involved in providing care):   Patient Care Team:  Markos Cardoso MD as PCP - General (Family Medicine)  Markos Cardoso MD as PCP - [facility] Empaneled Provider  Lillie Patel MD as Consulting Physician (Urology)  Mary Anne Shrestha MD (Rheumatology)  Jacinda Nguyen MD (Obstetrics & Gynecology)  Xiomara Roman (Dermatology)  Juan Mendosa MD as Consulting Physician (Dermatology)  Joannie Osler, MD as Consulting Physician (Gastroenterology)  Chelo Park MD as Referring Physician (General Surgery)    Wt Readings from Last 3 Encounters:   08/20/21 152 lb 6 oz (69.1 kg)   07/13/21 151 lb 12.8 oz (68.9 kg)   12/08/20 153 lb (69.4 kg)     Vitals:    08/20/21 1126   BP: 122/64   Pulse: 62   SpO2: 96%   Weight: 152 lb 6 oz (69.1 kg)   Height: 5' 4.5\" (1.638 m)     Body mass index is 25.75 kg/m². Based upon direct observation of the patient, evaluation of cognition reveals occasional short term issues.     Vitals:    08/20/21 1126   BP: 122/64   Pulse: 62   SpO2: 96%   Weight: 152 lb 6 oz (69.1 kg)   Height: 5' 4.5\" (1.638 m) 10/26/2015, 09/19/2016, 09/17/2018, 08/22/2020    Influenza, Intradermal, Preservative free 11/14/2012    Pneumococcal Conjugate 13-valent ([id number]) 10/26/2015    Pneumococcal Polysaccharide ([id number]) 11/14/2012    Tdap (Boostrix, Adacel) 06/12/2012    Zoster Live (Zostavax) 01/01/2011    Zoster Recombinant (Shingrix) 11/20/2019, 08/22/2020        Health Maintenance   Topic Date Due    Colon cancer screen colonoscopy  03/05/2021    Annual Wellness Visit (AWV)  08/19/2021    Flu vaccine (1) 09/01/2021    DTaP/Tdap/Td vaccine (2 - Td or Tdap) 06/12/2022    Potassium monitoring  07/06/2022    Creatinine monitoring  07/06/2022    Breast cancer screen  01/13/2023    Lipid screen  07/06/2026    DEXA (modify frequency per FRAX score)  Completed    Shingles Vaccine  Completed    Pneumococcal 65+ years Vaccine  Completed    COVID-19 Vaccine  Completed    Hepatitis C screen  Completed    Hepatitis A vaccine  Aged Out    Hepatitis B vaccine  Aged Out    Hib vaccine  Aged Out    Meningococcal (ACWY) vaccine  Aged Out     Recommendations for [employer] [place] Due: see orders and patient instructions/AVS.  . Recommended screening schedule for the next 5-10 years is provided to the patient in written form: see Patient Instructions/AVS.    Maynor Ovalles was seen today for medicare awv. Diagnoses and all orders for this visit:    Medicare annual wellness visit, subsequent/Routine general medical examination at a health care facility  Return in 1 year  Memory disorder  Patient recently saw the neurologist 3 days ago, Dr Josy Thompson. I have not seen his note yet. Continue Namenda.   Return 3 months  Alcohol avoidance  Essential hypertension  Stable/Controlled  Continue current treatment  Home BP checks  Return 3 months    Insomnia, unspecified type  OARRS report reviewed and no inconsistencies noted   The patient understands the risks of dependency/addiction with zolpidem and will take as little as possible and discontinue as soon as possible     Malignant neoplasm of transverse colon [facility])  Patient follows up with GI      Health Maintenance reviewed with the patient and up to date

## 2021-08-21 DIAGNOSIS — G47.00 INSOMNIA, UNSPECIFIED TYPE: ICD-10-CM

## 2021-08-23 RX ORDER — ZOLPIDEM TARTRATE 6.25 MG/1
TABLET, FILM COATED, EXTENDED RELEASE ORAL
Qty: 90 TABLET | Refills: 0 | Status: SHIPPED | OUTPATIENT
Start: 2021-08-23 | End: 2021-11-17

## 2021-08-23 NOTE — TELEPHONE ENCOUNTER
Medication:   Requested Prescriptions     Pending Prescriptions Disp Refills    zolpidem (AMBIEN CR) 6.25 MG extended release tablet [Pharmacy Med Name: ZOLPIDEM TART ER 6.25 MG TAB] 90 tablet      Sig: TAKE ONE TABLET BY MOUTH EVERY NIGHT AT BEDTIME AS NEEDED        Last Filled:  6/3/21 #90, 0 RF     Patient Phone Number: [phone] (home)     Last appt: 8/20/2021 AWV   Next appt: Visit date not found    Last OARRS:   RX Monitoring 8/20/2021   Attestation -   Periodic Controlled Substance Monitoring No signs of potential drug abuse or diversion identified.

## 2021-09-17 DIAGNOSIS — M54.50 CHRONIC BILATERAL LOW BACK PAIN WITHOUT SCIATICA: ICD-10-CM

## 2021-09-17 DIAGNOSIS — G89.29 CHRONIC BILATERAL LOW BACK PAIN WITHOUT SCIATICA: ICD-10-CM

## 2021-09-17 RX ORDER — DULOXETIN HYDROCHLORIDE 30 MG/1
CAPSULE, DELAYED RELEASE ORAL
Qty: 90 CAPSULE | Refills: 3 | Status: SHIPPED | OUTPATIENT
Start: 2021-09-17 | End: 2022-10-03

## 2021-11-17 DIAGNOSIS — G47.00 INSOMNIA, UNSPECIFIED TYPE: ICD-10-CM

## 2021-11-17 RX ORDER — ZOLPIDEM TARTRATE 6.25 MG/1
TABLET, FILM COATED, EXTENDED RELEASE ORAL
Qty: 90 TABLET | Refills: 0 | Status: SHIPPED | OUTPATIENT
Start: 2021-11-17 | End: 2022-02-08

## 2021-11-17 NOTE — TELEPHONE ENCOUNTER
Medication:   Requested Prescriptions     Pending Prescriptions Disp Refills    zolpidem (AMBIEN CR) 6.25 MG extended release tablet [Pharmacy Med Name: ZOLPIDEM TART ER 6.25 MG TAB] 90 tablet 0     Sig: TAKE ONE TABLET BY MOUTH EVERY NIGHT AT BEDTIME AS NEEDED        Last Filled:  8/23/21    Patient Phone Number: [phone] (home)     Last appt: 8/20/2021   Next appt: Visit date not found    Last OARRS:   RX Monitoring 8/20/2021   Attestation -   Periodic Controlled Substance Monitoring No signs of potential drug abuse or diversion identified.

## 2022-01-14 ENCOUNTER — HOSPITAL ENCOUNTER (OUTPATIENT)
Dept: MAMMOGRAPHY | Age: 77
Discharge: HOME | End: 2022-01-14
Payer: MEDICARE

## 2022-01-14 VITALS — WEIGHT: 150 LBS | HEIGHT: 65 IN | BODY MASS INDEX: 24.99 KG/M2

## 2022-01-14 DIAGNOSIS — Z12.31 BREAST CANCER SCREENING BY MAMMOGRAM: ICD-10-CM

## 2022-01-14 PROCEDURE — 77063 BREAST TOMOSYNTHESIS BI: CPT

## 2022-02-08 DIAGNOSIS — G47.00 INSOMNIA, UNSPECIFIED TYPE: ICD-10-CM

## 2022-02-08 RX ORDER — ZOLPIDEM TARTRATE 6.25 MG/1
TABLET, FILM COATED, EXTENDED RELEASE ORAL
Qty: 90 TABLET | Refills: 0 | Status: SHIPPED | OUTPATIENT
Start: 2022-02-08 | End: 2022-05-12

## 2022-02-08 NOTE — TELEPHONE ENCOUNTER
Medication:   Requested Prescriptions     Pending Prescriptions Disp Refills    zolpidem (AMBIEN CR) 6.25 MG extended release tablet [Pharmacy Med Name: ZOLPIDEM TART ER 6.25 MG TAB] 90 tablet      Sig: TAKE ONE TABLET BY MOUTH EVERY NIGHT AT BEDTIME AS NEEDED        Last Filled:  11/17/2021, 90, 0    Patient Phone Number: [phone] (home)     Last appt: 8/20/2021   Next appt: Visit date not found    Last OARRS:   RX Monitoring 8/20/2021   Attestation -   Periodic Controlled Substance Monitoring No signs of potential drug abuse or diversion identified.

## 2022-02-15 ENCOUNTER — PATIENT MESSAGE (OUTPATIENT)
Dept: FAMILY MEDICINE CLINIC | Age: 77
End: 2022-02-15

## 2022-02-15 NOTE — TELEPHONE ENCOUNTER
From: Ivan Estrella  To: Dr. Gandhi Sizer: 2/15/2022 10:47 AM EST  Subject: Ambien prescription     I have lost about some of my pills and I can not  Renew my prescription until March 1st. I have been awake for 2 days. Is their some way you could have about 5-6 pills available for me at [facility]? Just to try and get through with a few nights sleep. I am exhausted.    Malorie

## 2022-02-17 ENCOUNTER — TELEPHONE (OUTPATIENT)
Dept: FAMILY MEDICINE CLINIC | Age: 77
End: 2022-02-17

## 2022-02-22 DIAGNOSIS — B00.1 FEVER BLISTER: ICD-10-CM

## 2022-02-22 RX ORDER — VALACYCLOVIR HYDROCHLORIDE 500 MG/1
TABLET, FILM COATED ORAL
Qty: 12 TABLET | Refills: 5 | Status: SHIPPED | OUTPATIENT
Start: 2022-02-22

## 2022-02-22 NOTE — TELEPHONE ENCOUNTER
Medication:   Requested Prescriptions     Pending Prescriptions Disp Refills    valACYclovir (VALTREX) 500 MG tablet [Pharmacy Med Name: VALACYCLOVIR HCL TABS 500MG] 12 tablet 59     Sig: TAKE 2 TABLETS NOW, THEN TAKE 2 TABLETS IN 12 HOURS        Last Filled:  11/30/20 with 2 refills    Patient Phone Number: [phone] (home)     Last appt: 8/20/2021   Next appt: Visit date not found    Last OARRS:   RX Monitoring 8/20/2021   Attestation -   Periodic Controlled Substance Monitoring No signs of potential drug abuse or diversion identified.

## 2022-03-31 PROBLEM — M81.0 AGE-RELATED OSTEOPOROSIS WITHOUT CURRENT PATHOLOGICAL FRACTURE: Status: ACTIVE | Noted: 2022-03-30

## 2022-04-01 ENCOUNTER — NURSE ONLY (OUTPATIENT)
Dept: FAMILY MEDICINE CLINIC | Age: 77
End: 2022-04-01
Payer: MEDICARE

## 2022-04-01 DIAGNOSIS — R39.9 UTI SYMPTOMS: Primary | ICD-10-CM

## 2022-04-01 LAB
BILIRUBIN, POC: NORMAL
BLOOD URINE, POC: NORMAL
CLARITY, POC: NORMAL
COLOR, POC: YELLOW
GLUCOSE URINE, POC: NORMAL
KETONES, POC: NORMAL
LEUKOCYTE EST, POC: POSITIVE
NITRITE, POC: NORMAL
PH, POC: 5.5
PROTEIN, POC: NORMAL
SPECIFIC GRAVITY, POC: 1.01
UROBILINOGEN, POC: 0.2

## 2022-04-01 PROCEDURE — 81002 URINALYSIS NONAUTO W/O SCOPE: CPT | Performed by: FAMILY MEDICINE

## 2022-04-01 RX ORDER — AMMONIUM LACTATE 12 G/100G
CREAM TOPICAL
Qty: 1 EACH | Refills: 4 | Status: SHIPPED | OUTPATIENT
Start: 2022-04-01

## 2022-04-01 NOTE — TELEPHONE ENCOUNTER
Medication:   Requested Prescriptions     Pending Prescriptions Disp Refills    ammonium lactate (AMLACTIN) 12 % cream [Pharmacy Med Name: AMMONIUM LACTATE 12% CREAM] 1 each 4     Sig: APPLY TO AFFECTED AREA(S) DAILY AS NEEDED        Last Filled:  3/23/2021, 1, 4    Patient Phone Number: [phone] (home)     Last appt: 8/20/2021   Next appt: 8/22/2022    Last OARRS:   RX Monitoring 8/20/2021   Attestation -   Periodic Controlled Substance Monitoring No signs of potential drug abuse or diversion identified.

## 2022-04-04 LAB
ORGANISM: ABNORMAL
URINE CULTURE, ROUTINE: ABNORMAL

## 2022-04-04 RX ORDER — NITROFURANTOIN 25; 75 MG/1; MG/1
100 CAPSULE ORAL 2 TIMES DAILY
Qty: 14 CAPSULE | Refills: 0 | Status: SHIPPED | OUTPATIENT
Start: 2022-04-04 | End: 2022-04-11

## 2022-05-12 DIAGNOSIS — G47.00 INSOMNIA, UNSPECIFIED TYPE: ICD-10-CM

## 2022-05-12 RX ORDER — ZOLPIDEM TARTRATE 6.25 MG/1
TABLET, FILM COATED, EXTENDED RELEASE ORAL
Qty: 90 TABLET | Refills: 0 | Status: SHIPPED | OUTPATIENT
Start: 2022-05-12 | End: 2022-07-28

## 2022-05-12 NOTE — TELEPHONE ENCOUNTER
Medication:   Requested Prescriptions     Pending Prescriptions Disp Refills    zolpidem (AMBIEN CR) 6.25 MG extended release tablet [Pharmacy Med Name: ZOLPIDEM TART ER 6.25 MG TAB] 90 tablet      Sig: TAKE ONE TABLET BY MOUTH EVERY NIGHT AT BEDTIME AS NEEDED        Last Filled:  2/8/2022    Patient Phone Number: [phone] (home)     Last appt: 8/20/2021   Next appt: 8/22/2022    Last OARRS:   RX Monitoring 8/20/2021   Attestation -   Periodic Controlled Substance Monitoring No signs of potential drug abuse or diversion identified.

## 2022-07-28 DIAGNOSIS — G47.00 INSOMNIA, UNSPECIFIED TYPE: ICD-10-CM

## 2022-07-28 RX ORDER — ZOLPIDEM TARTRATE 6.25 MG/1
TABLET, FILM COATED, EXTENDED RELEASE ORAL
Qty: 90 TABLET | Refills: 0 | Status: SHIPPED | OUTPATIENT
Start: 2022-07-28 | End: 2022-08-09 | Stop reason: SDUPTHER

## 2022-07-28 NOTE — TELEPHONE ENCOUNTER
Lov 8/21/21  Lrf  90 0 5/12/22 Medication:   Requested Prescriptions     Pending Prescriptions Disp Refills    zolpidem (AMBIEN CR) 6.25 MG extended release tablet [Pharmacy Med Name: ZOLPIDEM TART ER 6.25 MG TAB] 90 tablet      Sig: TAKE ONE TABLET BY MOUTH EVERY NIGHT AT BEDTIME AS NEEDED        Last Filled:      Patient Phone Number: [phone] (home)     Last appt: 8/20/2021   Next appt: 8/22/2022    Last OARRS:   RX Monitoring 8/20/2021   Attestation -   Periodic Controlled Substance Monitoring No signs of potential drug abuse or diversion identified.

## 2022-07-29 ENCOUNTER — TELEPHONE (OUTPATIENT)
Dept: ADMINISTRATIVE | Age: 77
End: 2022-07-29

## 2022-07-29 NOTE — TELEPHONE ENCOUNTER
Submitted PA for Zolpidem Tartrate ER 6.25MG er tablets     Via CM Key: [id number] STATUS: APPROVED. LETTER ATTACHED. If this requires a response please respond to the pool. [address]). Please advise patient thank you. Statement Selected

## 2022-08-09 DIAGNOSIS — G47.00 INSOMNIA, UNSPECIFIED TYPE: ICD-10-CM

## 2022-08-09 RX ORDER — ZOLPIDEM TARTRATE 6.25 MG/1
TABLET, FILM COATED, EXTENDED RELEASE ORAL
Qty: 90 TABLET | Refills: 0 | Status: SHIPPED | OUTPATIENT
Start: 2022-08-09 | End: 2022-09-09

## 2022-08-22 ENCOUNTER — OFFICE VISIT (OUTPATIENT)
Dept: FAMILY MEDICINE CLINIC | Age: 77
End: 2022-08-22
Payer: MEDICARE

## 2022-08-22 VITALS
WEIGHT: 150 LBS | BODY MASS INDEX: 25.61 KG/M2 | HEART RATE: 62 BPM | SYSTOLIC BLOOD PRESSURE: 122 MMHG | DIASTOLIC BLOOD PRESSURE: 60 MMHG | OXYGEN SATURATION: 99 % | HEIGHT: 64 IN

## 2022-08-22 DIAGNOSIS — D47.2 IGA GAMMOPATHY: ICD-10-CM

## 2022-08-22 DIAGNOSIS — Z00.00 MEDICARE ANNUAL WELLNESS VISIT, SUBSEQUENT: Primary | ICD-10-CM

## 2022-08-22 DIAGNOSIS — G47.00 INSOMNIA, UNSPECIFIED TYPE: ICD-10-CM

## 2022-08-22 DIAGNOSIS — R35.0 URINARY FREQUENCY: ICD-10-CM

## 2022-08-22 DIAGNOSIS — I10 ESSENTIAL HYPERTENSION: ICD-10-CM

## 2022-08-22 DIAGNOSIS — Z23 NEED FOR TDAP VACCINATION: ICD-10-CM

## 2022-08-22 DIAGNOSIS — C18.4 MALIGNANT NEOPLASM OF TRANSVERSE COLON (HCC): ICD-10-CM

## 2022-08-22 DIAGNOSIS — R41.3 MEMORY DISORDER: ICD-10-CM

## 2022-08-22 LAB
BILIRUBIN, POC: NORMAL
BLOOD URINE, POC: NORMAL
CLARITY, POC: CLEAR
COLOR, POC: YELLOW
GLUCOSE URINE, POC: NORMAL
KETONES, POC: NORMAL
LEUKOCYTE EST, POC: NORMAL
NITRITE, POC: NORMAL
PH, POC: 5.5
PROTEIN, POC: NORMAL
SPECIFIC GRAVITY, POC: 1.03
UROBILINOGEN, POC: 0.2

## 2022-08-22 PROCEDURE — 81002 URINALYSIS NONAUTO W/O SCOPE: CPT | Performed by: FAMILY MEDICINE

## 2022-08-22 PROCEDURE — G0439 PPPS, SUBSEQ VISIT: HCPCS | Performed by: FAMILY MEDICINE

## 2022-08-22 PROCEDURE — 1123F ACP DISCUSS/DSCN MKR DOCD: CPT | Performed by: FAMILY MEDICINE

## 2022-08-22 SDOH — ECONOMIC STABILITY: FOOD INSECURITY: WITHIN THE PAST 12 MONTHS, THE FOOD YOU BOUGHT JUST DIDN'T LAST AND YOU DIDN'T HAVE MONEY TO GET MORE.: NEVER TRUE

## 2022-08-22 SDOH — ECONOMIC STABILITY: FOOD INSECURITY: WITHIN THE PAST 12 MONTHS, YOU WORRIED THAT YOUR FOOD WOULD RUN OUT BEFORE YOU GOT MONEY TO BUY MORE.: NEVER TRUE

## 2022-08-22 ASSESSMENT — PATIENT HEALTH QUESTIONNAIRE - PHQ9
SUM OF ALL RESPONSES TO PHQ QUESTIONS 1-9: 0
SUM OF ALL RESPONSES TO PHQ9 QUESTIONS 1 & 2: 0
SUM OF ALL RESPONSES TO PHQ QUESTIONS 1-9: 0
SUM OF ALL RESPONSES TO PHQ QUESTIONS 1-9: 0
1. LITTLE INTEREST OR PLEASURE IN DOING THINGS: 0
SUM OF ALL RESPONSES TO PHQ QUESTIONS 1-9: 0
2. FEELING DOWN, DEPRESSED OR HOPELESS: 0

## 2022-08-22 ASSESSMENT — LIFESTYLE VARIABLES
HOW MANY STANDARD DRINKS CONTAINING ALCOHOL DO YOU HAVE ON A TYPICAL DAY: PATIENT DOES NOT DRINK
HOW OFTEN DO YOU HAVE A DRINK CONTAINING ALCOHOL: 4 OR MORE TIMES A WEEK

## 2022-08-22 ASSESSMENT — SOCIAL DETERMINANTS OF HEALTH (SDOH): HOW HARD IS IT FOR YOU TO PAY FOR THE VERY BASICS LIKE FOOD, HOUSING, MEDICAL CARE, AND HEATING?: NOT HARD AT ALL

## 2022-08-22 NOTE — PATIENT INSTRUCTIONS
Personalized Preventive Plan for Theta Nicely - 8/22/2022  Medicare offers a range of preventive health benefits. Some of the tests and screenings are paid in full while other may be subject to a deductible, co-insurance, and/or copay. Some of these benefits include a comprehensive review of your medical history including lifestyle, illnesses that may run in your family, and various assessments and screenings as appropriate. After reviewing your medical record and screening and assessments performed today your provider may have ordered immunizations, labs, imaging, and/or referrals for you. A list of these orders (if applicable) as well as your Preventive Care list are included within your After Visit Summary for your review. Other Preventive Recommendations:    A preventive eye exam performed by an eye specialist is recommended every 1-2 years to screen for glaucoma; cataracts, macular degeneration, and other eye disorders. A preventive dental visit is recommended every 6 months. Try to get at least 150 minutes of exercise per week or 10,000 steps per day on a pedometer . Order or download the FREE \"Exercise & Physical Activity: Your Everyday Guide\" from [employer]. Call 9-466.372.4769 or search [employer] online. You need 0745-1103 mg of calcium and 4982-5775 IU of vitamin D per day. It is possible to meet your calcium requirement with diet alone, but a vitamin D supplement is usually necessary to meet this goal.  When exposed to the sun, use a sunscreen that protects against both UVA and UVB radiation with an SPF of 30 or greater. Reapply every 2 to 3 hours or after sweating, drying off with a towel, or swimming. Always wear a seat belt when traveling in a car. Always wear a helmet when riding a bicycle or motorcycle.

## 2022-08-22 NOTE — PROGRESS NOTES
Medicare Annual Wellness Visit    Malorie Barajas is here for Medicare AWV    Assessment & Plan   Medicare annual wellness visit, subsequent  Return 1 year  Essential hypertension  Well-controlled. Continue current treatment. Home blood pressure checks. Patient does Pilates a few times per week: Continue exercise. Return in 6 months  -     CBC with Auto Differential; Future  -     Comprehensive Metabolic Panel; Future  -     Lipid Panel; Future  -     TSH with Reflex; Future  Malignant neoplasm of transverse colon ([facility])  -     COLONOSCOPY (Screening); Future: She states she will schedule with Dr. Errol Serrano in January. Insomnia, unspecified type  OARRS report reviewed and no inconsistencies noted   The patient understands the risks of dependency/addiction with zolpidem and will take as little as possible and discontinue as soon as possible   Return in 6 months  IgA gammopathy  Patient states she saw a hematologist and will check back in in the fall with him however when she did see him he told her there was nothing to worry about. Memory disorder  Patient never did take Namenda and also feels she does not have a current problem with this. Need for Tdap vaccination  Patient will check with pharmacy and will get the vaccine there. Urinary frequency  -     POCT Urinalysis no Micro  She declined urine culture and did not feel the need for any further work-up or treatment. Recommendations for Preventive Services Due: see orders and patient instructions/AVS.  Recommended screening schedule for the next 5-10 years is provided to the patient in written form: see Patient Instructions/AVS.    Health Maintenance reviewed with the patient      Return for Medicare Annual Wellness Visit in 1 year. Subjective   The following acute and/or chronic problems were also addressed today:  See A/S    Patient's complete Health Risk Assessment and screening values have been reviewed and are found in Flowsheets.  The following problems were reviewed today and where indicated follow up appointments were made and/or referrals ordered. Positive Risk Factor Screenings with Interventions:             General Health and ACP:  General  In general, how would you say your health is?: Very Good  In the past 7 days, have you experienced any of the following: New or Increased Pain, New or Increased Fatigue, Loneliness, Social Isolation, Stress or Anger?: No  Do you get the social and emotional support that you need?: Yes  Do you have a Living Will?: Yes    Advance Directives       Power of  Living Will ACP-Advance Directive ACP-Power of     Not on File Not on File Not on File Not on File        General Health Risk Interventions:  No new issues              Objective   Vitals:    08/22/22 1300   BP: 122/60   Pulse: 62   SpO2: 99%   Weight: 150 lb (68 kg)   Height: 5' 4\" (1.626 m)      Body mass index is 25.75 kg/m². Vitals:    08/22/22 1300   BP: 122/60   Pulse: 62   SpO2: 99%   Weight: 150 lb (68 kg)   Height: 5' 4\" (1.626 m)     Body mass index is 25.75 kg/m².    General Appearance: alert and oriented, in no acute distress  Skin: warm and dry, no rash or erythema  Head: normocephalic and atraumatic  Eyes: pupils equal, round, and reactive to light, extraocular eye movements intact, conjunctivae normal  ENT: tympanic membrane, external ear and ear canal normal bilaterally, nose without deformity,   Neck: supple and non-tender without mass, no thyromegaly or thyroid nodules, no cervical lymphadenopathy  Pulmonary/Chest: clear to auscultation bilaterally- no wheezes, rales or rhonchi, normal air movement, no respiratory distress  Cardiovascular: normal rate, regular rhythm, normal S1 and S2, no murmurs, rubs, clicks, or gallops, no carotid bruits  Abdomen: soft, non-tender, non-distended, normal bowel sounds, no masses or organomegaly  Extremities: no cyanosis, clubbing or edema  Neurologic: reflexes normal and symmetric, no cranial nerve deficit, speech normal  GYN:Rectal: deferred to Gynecologist  Breast: deferred to Gynecologist        No Known Allergies  Prior to Visit Medications    Medication Sig Taking? Authorizing Provider   zolpidem (AMBIEN CR) 6.25 MG extended release tablet 1 tab at bedtime Yes Sonja Oliveira MD   ammonium lactate (AMLACTIN) 12 % cream APPLY TO AFFECTED AREA(S) DAILY AS NEEDED Yes Sonja Oliveira MD   valACYclovir (VALTREX) 500 MG tablet TAKE 2 TABLETS NOW, THEN TAKE 2 TABLETS IN 12 HOURS Yes Sonja Oliveira MD   lisinopril (PRINIVIL;ZESTRIL) 30 MG tablet TAKE 1 TABLET DAILY Yes Sonja Oliveira MD   pantoprazole (PROTONIX) 40 MG tablet TAKE 1 TABLET EVERY MORNING BEFORE BREAKFAST Yes Sonja Oliveira MD   DULoxetine (CYMBALTA) 30 MG extended release capsule TAKE 1 CAPSULE DAILY Yes Sonja Oliveira MD   Cholecalciferol (VITAMIN D) 2000 UNITS CAPS capsule Take 1 capsule by mouth daily  Yes Historical Provider, MD [place] (Including outside providers/suppliers regularly involved in providing care):   Patient Care Team:  Sonja Oliveira MD as PCP - General (Family Medicine)  Sonja Oliveira MD as PCP - [address] [place] [address] Provider  Ivan Enriquez MD as Consulting Physician (Urology)  Katt Hernandez MD (Rheumatology)  Sharlene Aldrich MD (Obstetrics & Gynecology)  Radha Elise  (Dermatology)  Judy Bauer MD as Consulting Physician (Dermatology)  Braxton Pete MD as Consulting Physician (Gastroenterology)  Shannan Garay MD as Referring Physician (General Surgery)     Reviewed and updated this visit:  Tobacco  Allergies  Meds  Problems  Med Hx  Surg Hx  Soc Hx  Fam Hx

## 2022-08-23 DIAGNOSIS — I10 ESSENTIAL HYPERTENSION: ICD-10-CM

## 2022-08-23 LAB
A/G RATIO: 1.8 (ref 1.1–2.2)
ALBUMIN SERPL-MCNC: 4.1 G/DL (ref 3.4–5)
ALP BLD-CCNC: 61 U/L (ref 40–129)
ALT SERPL-CCNC: 11 U/L (ref 10–40)
ANION GAP SERPL CALCULATED.3IONS-SCNC: 11 MMOL/L (ref 3–16)
AST SERPL-CCNC: 15 U/L (ref 15–37)
BASOPHILS ABSOLUTE: 0.1 K/UL (ref 0–0.2)
BASOPHILS RELATIVE PERCENT: 1.3 %
BILIRUB SERPL-MCNC: 0.6 MG/DL (ref 0–1)
BUN BLDV-MCNC: 18 MG/DL (ref 7–20)
CALCIUM SERPL-MCNC: 9.8 MG/DL (ref 8.3–10.6)
CHLORIDE BLD-SCNC: 107 MMOL/L (ref 99–110)
CHOLESTEROL, TOTAL: 208 MG/DL (ref 0–199)
CO2: 25 MMOL/L (ref 21–32)
CREAT SERPL-MCNC: 0.7 MG/DL (ref 0.6–1.2)
EOSINOPHILS ABSOLUTE: 0.1 K/UL (ref 0–0.6)
EOSINOPHILS RELATIVE PERCENT: 3.3 %
GFR AFRICAN AMERICAN: >60
GFR NON-AFRICAN AMERICAN: >60
GLUCOSE BLD-MCNC: 94 MG/DL (ref 70–99)
HCT VFR BLD CALC: 39.3 % (ref 36–48)
HDLC SERPL-MCNC: 85 MG/DL (ref 40–60)
HEMOGLOBIN: 12.7 G/DL (ref 12–16)
LDL CHOLESTEROL CALCULATED: 108 MG/DL
LYMPHOCYTES ABSOLUTE: 1.6 K/UL (ref 1–5.1)
LYMPHOCYTES RELATIVE PERCENT: 37.6 %
MCH RBC QN AUTO: 30.4 PG (ref 26–34)
MCHC RBC AUTO-ENTMCNC: 32.4 G/DL (ref 31–36)
MCV RBC AUTO: 94 FL (ref 80–100)
MONOCYTES ABSOLUTE: 0.4 K/UL (ref 0–1.3)
MONOCYTES RELATIVE PERCENT: 9.2 %
NEUTROPHILS ABSOLUTE: 2.1 K/UL (ref 1.7–7.7)
NEUTROPHILS RELATIVE PERCENT: 48.6 %
PDW BLD-RTO: 15.1 % (ref 12.4–15.4)
PLATELET # BLD: 233 K/UL (ref 135–450)
PMV BLD AUTO: 8.5 FL (ref 5–10.5)
POTASSIUM SERPL-SCNC: 4.7 MMOL/L (ref 3.5–5.1)
RBC # BLD: 4.18 M/UL (ref 4–5.2)
SODIUM BLD-SCNC: 143 MMOL/L (ref 136–145)
TOTAL PROTEIN: 6.4 G/DL (ref 6.4–8.2)
TRIGL SERPL-MCNC: 76 MG/DL (ref 0–150)
TSH REFLEX: 0.73 UIU/ML (ref 0.27–4.2)
VLDLC SERPL CALC-MCNC: 15 MG/DL
WBC # BLD: 4.2 K/UL (ref 4–11)

## 2022-08-28 NOTE — TELEPHONE ENCOUNTER
ADELA    (see pt msg dated 2/15 - that was closed)    zolpidem (AMBIEN CR) 6.25 MG extended release tablet      Pt said she has been speaking with pharmacist at [facility] and they will not fill this medication for 10 days. Vaginal Delivery

## 2022-10-03 DIAGNOSIS — M54.50 CHRONIC BILATERAL LOW BACK PAIN WITHOUT SCIATICA: ICD-10-CM

## 2022-10-03 DIAGNOSIS — G89.29 CHRONIC BILATERAL LOW BACK PAIN WITHOUT SCIATICA: ICD-10-CM

## 2022-10-03 RX ORDER — DULOXETIN HYDROCHLORIDE 30 MG/1
CAPSULE, DELAYED RELEASE ORAL
Qty: 90 CAPSULE | Refills: 3 | Status: SHIPPED | OUTPATIENT
Start: 2022-10-03

## 2022-10-03 NOTE — TELEPHONE ENCOUNTER
Medication:   Requested Prescriptions     Pending Prescriptions Disp Refills    DULoxetine (CYMBALTA) 30 MG extended release capsule [Pharmacy Med Name: DULOXETINE HCL DR CAPS 30MG] 90 capsule 3     Sig: TAKE 1 CAPSULE DAILY        Last Filled:  9/17/2021    Patient Phone Number: [phone] (home)     Last appt: 8/22/2022   Next appt: Visit date not found    Last OARRS:   RX Monitoring 8/22/2022   Attestation -   Periodic Controlled Substance Monitoring No signs of potential drug abuse or diversion identified.

## 2022-11-16 ENCOUNTER — TELEPHONE (OUTPATIENT)
Dept: FAMILY MEDICINE CLINIC | Age: 77
End: 2022-11-16

## 2022-11-16 DIAGNOSIS — I10 ESSENTIAL HYPERTENSION: ICD-10-CM

## 2022-11-16 DIAGNOSIS — G47.33 OSA (OBSTRUCTIVE SLEEP APNEA): Primary | ICD-10-CM

## 2022-11-16 NOTE — TELEPHONE ENCOUNTER
[address] [facility], [address] [facility], [address] [facility] [address]  [place], [address]  Phone: [phone]

## 2022-11-16 NOTE — TELEPHONE ENCOUNTER
----- Message from Alivia Daly sent at 11/16/2022  8:21 AM EST -----  Subject: Referral Request    Reason for referral request? sleep apnea test   Provider patient wants to be referred to(if known):     Provider Phone Number(if known):     Additional Information for Provider?   ---------------------------------------------------------------------------  --------------  [address]    [phone]; OK to leave message on voicemail  ---------------------------------------------------------------------------  -------------- no paresthesia/fingers/toes warm to touch/capillary refill time < 2 seconds/no cyanosis of extremity

## 2022-11-18 RX ORDER — LISINOPRIL 30 MG/1
TABLET ORAL
Qty: 90 TABLET | Refills: 2 | Status: SHIPPED | OUTPATIENT
Start: 2022-11-18

## 2022-11-18 NOTE — TELEPHONE ENCOUNTER
Medication:   Requested Prescriptions     Pending Prescriptions Disp Refills    lisinopril (PRINIVIL;ZESTRIL) 30 MG tablet [Pharmacy Med Name: LISINOPRIL TABS 30MG] 90 tablet 3     Sig: TAKE 1 TABLET DAILY        Last Filled:  90 x 2 RF 2/9/22    Patient Phone Number: [phone] (home)     Last appt: 8/22/2022   Next appt: Visit date not found    Last OARRS:   RX Monitoring 8/22/2022   Attestation -   Periodic Controlled Substance Monitoring No signs of potential drug abuse or diversion identified.

## 2023-01-13 ENCOUNTER — TELEPHONE (OUTPATIENT)
Dept: FAMILY MEDICINE CLINIC | Age: 78
End: 2023-01-13

## 2023-01-13 NOTE — TELEPHONE ENCOUNTER
Patient called and was told by insurance she no longer can use [facility]     Her new pharm is [facility] going forward

## 2023-01-16 ENCOUNTER — HOSPITAL ENCOUNTER (OUTPATIENT)
Dept: MAMMOGRAPHY | Age: 78
Discharge: HOME | End: 2023-01-16
Payer: MEDICARE

## 2023-01-16 VITALS — HEIGHT: 65 IN | WEIGHT: 148 LBS | BODY MASS INDEX: 24.66 KG/M2

## 2023-01-16 DIAGNOSIS — Z12.31 BREAST CANCER SCREENING BY MAMMOGRAM: ICD-10-CM

## 2023-01-16 PROCEDURE — 77067 SCR MAMMO BI INCL CAD: CPT

## 2023-02-03 ENCOUNTER — TELEPHONE (OUTPATIENT)
Dept: FAMILY MEDICINE CLINIC | Age: 78
End: 2023-02-03

## 2023-02-03 DIAGNOSIS — G47.00 INSOMNIA, UNSPECIFIED TYPE: ICD-10-CM

## 2023-02-03 RX ORDER — ZOLPIDEM TARTRATE 6.25 MG/1
TABLET, FILM COATED, EXTENDED RELEASE ORAL
Qty: 90 TABLET | Refills: 0 | Status: SHIPPED | OUTPATIENT
Start: 2023-02-03 | End: 2023-03-06

## 2023-02-03 NOTE — TELEPHONE ENCOUNTER
Medication and Quantity requested: zolpidem (AMBIEN CR) 6.25 MG extended release tablet     Qty  90 tablet 3 month supply    Last seen 09/19/2022    Pharmacy and phone number updated in EPIC:  yes

## 2023-03-15 DIAGNOSIS — K21.9 GASTROESOPHAGEAL REFLUX DISEASE, UNSPECIFIED WHETHER ESOPHAGITIS PRESENT: ICD-10-CM

## 2023-03-15 RX ORDER — PANTOPRAZOLE SODIUM 40 MG/1
TABLET, DELAYED RELEASE ORAL
Qty: 90 TABLET | Refills: 3 | Status: SHIPPED | OUTPATIENT
Start: 2023-03-15

## 2023-03-15 NOTE — TELEPHONE ENCOUNTER
Medication:   Requested Prescriptions     Pending Prescriptions Disp Refills    pantoprazole (PROTONIX) 40 MG tablet [Pharmacy Med Name: PANTOPRAZOLE SODIUM DR TABS 40MG] 90 tablet 3     Sig: TAKE 1 TABLET EVERY MORNING BEFORE BREAKFAST        Last Filled:  1/28/2022, 90, 3    Patient Phone Number: [phone] (home)     Last appt: 8/22/2022   Next appt: Visit date not found    Last OARRS:   RX Monitoring 8/22/2022   Attestation -   Periodic Controlled Substance Monitoring No signs of potential drug abuse or diversion identified.

## 2023-04-18 ENCOUNTER — OFFICE VISIT (OUTPATIENT)
Dept: FAMILY MEDICINE CLINIC | Age: 78
End: 2023-04-18

## 2023-04-18 VITALS
BODY MASS INDEX: 24.66 KG/M2 | DIASTOLIC BLOOD PRESSURE: 80 MMHG | HEART RATE: 65 BPM | SYSTOLIC BLOOD PRESSURE: 122 MMHG | WEIGHT: 148 LBS | OXYGEN SATURATION: 98 % | HEIGHT: 65 IN

## 2023-04-18 DIAGNOSIS — D47.2 IGA GAMMOPATHY: ICD-10-CM

## 2023-04-18 DIAGNOSIS — G47.00 INSOMNIA, UNSPECIFIED TYPE: ICD-10-CM

## 2023-04-18 DIAGNOSIS — C18.4 MALIGNANT NEOPLASM OF TRANSVERSE COLON (HCC): ICD-10-CM

## 2023-04-18 DIAGNOSIS — I10 ESSENTIAL HYPERTENSION: Primary | ICD-10-CM

## 2023-04-18 RX ORDER — ZOLPIDEM TARTRATE 6.25 MG/1
TABLET, FILM COATED, EXTENDED RELEASE ORAL
Qty: 90 TABLET | Refills: 0 | Status: SHIPPED | OUTPATIENT
Start: 2023-04-18 | End: 2023-05-19

## 2023-04-18 SDOH — ECONOMIC STABILITY: FOOD INSECURITY: WITHIN THE PAST 12 MONTHS, THE FOOD YOU BOUGHT JUST DIDN'T LAST AND YOU DIDN'T HAVE MONEY TO GET MORE.: PATIENT DECLINED

## 2023-04-18 SDOH — ECONOMIC STABILITY: FOOD INSECURITY: WITHIN THE PAST 12 MONTHS, YOU WORRIED THAT YOUR FOOD WOULD RUN OUT BEFORE YOU GOT MONEY TO BUY MORE.: PATIENT DECLINED

## 2023-04-18 SDOH — ECONOMIC STABILITY: INCOME INSECURITY: HOW HARD IS IT FOR YOU TO PAY FOR THE VERY BASICS LIKE FOOD, HOUSING, MEDICAL CARE, AND HEATING?: PATIENT DECLINED

## 2023-04-18 SDOH — ECONOMIC STABILITY: HOUSING INSECURITY
IN THE LAST 12 MONTHS, WAS THERE A TIME WHEN YOU DID NOT HAVE A STEADY PLACE TO SLEEP OR SLEPT IN A SHELTER (INCLUDING NOW)?: PATIENT REFUSED

## 2023-04-18 ASSESSMENT — PATIENT HEALTH QUESTIONNAIRE - PHQ9
SUM OF ALL RESPONSES TO PHQ QUESTIONS 1-9: 0
SUM OF ALL RESPONSES TO PHQ QUESTIONS 1-9: 0
2. FEELING DOWN, DEPRESSED OR HOPELESS: 0
SUM OF ALL RESPONSES TO PHQ QUESTIONS 1-9: 0
SUM OF ALL RESPONSES TO PHQ9 QUESTIONS 1 & 2: 0
1. LITTLE INTEREST OR PLEASURE IN DOING THINGS: 0
SUM OF ALL RESPONSES TO PHQ QUESTIONS 1-9: 0

## 2023-04-18 NOTE — PROGRESS NOTES
no wheezes or rales. Heart: S1 and S2 normal, no murmurs, clicks, gallops or rubs. Regular rate and rhythm. Ext[de-identified] No edema  Lab review: orders written for new lab studies as appropriate; see orders. Assessment/Plan:    Juan Ramon Cano was seen today for medication check. Diagnoses and all orders for this visit:    Essential hypertension  -     CBC with Auto Differential; Future  -     Comprehensive Metabolic Panel; Future  -     Lipid Panel; Future  -     TSH with Reflex;  Future  Reasonably well controlled  Continue current treatment  Home BP checks  Return 4 months for AWV  Insomnia, unspecified type  -     zolpidem (AMBIEN CR) 6.25 MG extended release tablet; 1 tab at bedtime  OARRS report reviewed and no inconsistencies noted   The patient understands the risks of dependency/addiction with put him and will take as little as possible and discontinue as soon as possible   Malignant neoplasm of transverse colon [facility])  Patient follows with GI, Dr. Agapito Mcgraw and oncology, Dr. Verner Shan  IgA gammopathy  Patient follows with Dr. Yamilet Rogers

## 2023-05-02 DIAGNOSIS — I10 ESSENTIAL HYPERTENSION: ICD-10-CM

## 2023-05-02 LAB
BASOPHILS # BLD: 0.1 K/UL (ref 0–0.2)
BASOPHILS NFR BLD: 1.3 %
DEPRECATED RDW RBC AUTO: 16.1 % (ref 12.4–15.4)
EOSINOPHIL # BLD: 0.1 K/UL (ref 0–0.6)
EOSINOPHIL NFR BLD: 3.8 %
HCT VFR BLD AUTO: 34.1 % (ref 36–48)
HGB BLD-MCNC: 10.9 G/DL (ref 12–16)
LYMPHOCYTES # BLD: 1.5 K/UL (ref 1–5.1)
LYMPHOCYTES NFR BLD: 39 %
MCH RBC QN AUTO: 26.6 PG (ref 26–34)
MCHC RBC AUTO-ENTMCNC: 32 G/DL (ref 31–36)
MCV RBC AUTO: 83 FL (ref 80–100)
MONOCYTES # BLD: 0.4 K/UL (ref 0–1.3)
MONOCYTES NFR BLD: 11.5 %
NEUTROPHILS # BLD: 1.7 K/UL (ref 1.7–7.7)
NEUTROPHILS NFR BLD: 44.4 %
PLATELET # BLD AUTO: 298 K/UL (ref 135–450)
PMV BLD AUTO: 8.8 FL (ref 5–10.5)
RBC # BLD AUTO: 4.1 M/UL (ref 4–5.2)
WBC # BLD AUTO: 3.8 K/UL (ref 4–11)

## 2023-05-03 LAB
ALBUMIN SERPL-MCNC: 4.2 G/DL (ref 3.4–5)
ALBUMIN/GLOB SERPL: 1.9 {RATIO} (ref 1.1–2.2)
ALP SERPL-CCNC: 65 U/L (ref 40–129)
ALT SERPL-CCNC: 13 U/L (ref 10–40)
ANION GAP SERPL CALCULATED.3IONS-SCNC: 11 MMOL/L (ref 3–16)
AST SERPL-CCNC: 18 U/L (ref 15–37)
BILIRUB SERPL-MCNC: 0.4 MG/DL (ref 0–1)
BUN SERPL-MCNC: 21 MG/DL (ref 7–20)
CALCIUM SERPL-MCNC: 9.3 MG/DL (ref 8.3–10.6)
CHLORIDE SERPL-SCNC: 107 MMOL/L (ref 99–110)
CHOLEST SERPL-MCNC: 212 MG/DL (ref 0–199)
CO2 SERPL-SCNC: 23 MMOL/L (ref 21–32)
CREAT SERPL-MCNC: 0.6 MG/DL (ref 0.6–1.2)
GFR SERPLBLD CREATININE-BSD FMLA CKD-EPI: >60 ML/MIN/{1.73_M2}
GLUCOSE SERPL-MCNC: 107 MG/DL (ref 70–99)
HDLC SERPL-MCNC: 82 MG/DL (ref 40–60)
LDLC SERPL CALC-MCNC: 116 MG/DL
POTASSIUM SERPL-SCNC: 4.4 MMOL/L (ref 3.5–5.1)
PROT SERPL-MCNC: 6.4 G/DL (ref 6.4–8.2)
SODIUM SERPL-SCNC: 141 MMOL/L (ref 136–145)
TRIGL SERPL-MCNC: 72 MG/DL (ref 0–150)
TSH SERPL DL<=0.005 MIU/L-ACNC: 0.71 UIU/ML (ref 0.27–4.2)
VLDLC SERPL CALC-MCNC: 14 MG/DL

## 2023-07-05 ENCOUNTER — TELEPHONE (OUTPATIENT)
Dept: FAMILY MEDICINE CLINIC | Age: 78
End: 2023-07-05

## 2023-07-05 DIAGNOSIS — Z00.00 MEDICARE ANNUAL WELLNESS VISIT, SUBSEQUENT: Primary | ICD-10-CM

## 2023-07-05 NOTE — TELEPHONE ENCOUNTER
----- Message from Chiara Stevens sent at 7/5/2023  9:02 AM EDT -----  Subject: Referral Request    Reason for referral request? PT HAS A AWV SCHEDULED FOR 8- AND   NEEDS BLOODWORK DONE BEFORE HER APPT . PLEASE CALL WHEN ORDERS ARE IN SO   SHE CAN GET BLOODWORK DONE ,PT WANTS TO DO THE WEEK BEFORE HER APPT   Provider patient wants to be referred to(if known):     Provider Phone Number(if known):     Additional Information for Provider?   ---------------------------------------------------------------------------  --------------  [address]    [phone]; OK to leave message on voicemail  ---------------------------------------------------------------------------  --------------

## 2023-07-11 RX ORDER — AMMONIUM LACTATE 12 G/100G
CREAM TOPICAL
Qty: 140 G | Refills: 0 | Status: SHIPPED | OUTPATIENT
Start: 2023-07-11

## 2023-07-11 NOTE — TELEPHONE ENCOUNTER
Medication:   Requested Prescriptions     Pending Prescriptions Disp Refills    ammonium lactate (AMLACTIN) 12 % cream [Pharmacy Med Name: AMMONIUM LACTATE 12% CREAM] 140 g 0     Sig: APPLY TO THE AFFECTED AREA(S) DAILY AS NEEDED        Last appt: 4/18/2023   Next appt: 8/15/2023    Last OARRS:   RX Monitoring 4/18/2023   Attestation -   Periodic Controlled Substance Monitoring No signs of potential drug abuse or diversion identified.

## 2023-07-27 DIAGNOSIS — G47.00 INSOMNIA, UNSPECIFIED TYPE: ICD-10-CM

## 2023-07-27 RX ORDER — ZOLPIDEM TARTRATE 6.25 MG/1
TABLET, FILM COATED, EXTENDED RELEASE ORAL
Qty: 90 TABLET | Refills: 0 | Status: SHIPPED | OUTPATIENT
Start: 2023-07-27 | End: 2023-08-27

## 2023-07-27 NOTE — TELEPHONE ENCOUNTER
Medication:   Requested Prescriptions     Pending Prescriptions Disp Refills    zolpidem (AMBIEN CR) 6.25 MG extended release tablet [Pharmacy Med Name: Zolpidem Tartrate ER Oral Tablet Extended Release 6.25 MG] 90 tablet 0     Sig: TAKE 1 TABLET BY MOUTH AT BEDTIME          Last appt: 4/18/2023   Next appt: 8/15/2023    Last OARRS:   RX Monitoring 4/18/2023   Attestation -   Periodic Controlled Substance Monitoring No signs of potential drug abuse or diversion identified.

## 2023-08-10 DIAGNOSIS — Z00.00 MEDICARE ANNUAL WELLNESS VISIT, SUBSEQUENT: ICD-10-CM

## 2023-08-11 LAB
ALBUMIN SERPL-MCNC: 4.1 G/DL (ref 3.4–5)
ALBUMIN/GLOB SERPL: 2 {RATIO} (ref 1.1–2.2)
ALP SERPL-CCNC: 59 U/L (ref 40–129)
ALT SERPL-CCNC: 13 U/L (ref 10–40)
ANION GAP SERPL CALCULATED.3IONS-SCNC: 12 MMOL/L (ref 3–16)
AST SERPL-CCNC: 17 U/L (ref 15–37)
BASOPHILS # BLD: 0.1 K/UL (ref 0–0.2)
BASOPHILS NFR BLD: 1.3 %
BILIRUB SERPL-MCNC: 0.4 MG/DL (ref 0–1)
BUN SERPL-MCNC: 11 MG/DL (ref 7–20)
CALCIUM SERPL-MCNC: 9.6 MG/DL (ref 8.3–10.6)
CHLORIDE SERPL-SCNC: 102 MMOL/L (ref 99–110)
CHOLEST SERPL-MCNC: 209 MG/DL (ref 0–199)
CO2 SERPL-SCNC: 26 MMOL/L (ref 21–32)
CREAT SERPL-MCNC: 0.7 MG/DL (ref 0.6–1.2)
DEPRECATED RDW RBC AUTO: 14.4 % (ref 12.4–15.4)
EOSINOPHIL # BLD: 0.2 K/UL (ref 0–0.6)
EOSINOPHIL NFR BLD: 4.2 %
GFR SERPLBLD CREATININE-BSD FMLA CKD-EPI: >60 ML/MIN/{1.73_M2}
GLUCOSE SERPL-MCNC: 104 MG/DL (ref 70–99)
HCT VFR BLD AUTO: 39.7 % (ref 36–48)
HDLC SERPL-MCNC: 79 MG/DL (ref 40–60)
HGB BLD-MCNC: 13.5 G/DL (ref 12–16)
LDL CHOLESTEROL CALCULATED: 117 MG/DL
LYMPHOCYTES # BLD: 1.5 K/UL (ref 1–5.1)
LYMPHOCYTES NFR BLD: 35.3 %
MCH RBC QN AUTO: 32.6 PG (ref 26–34)
MCHC RBC AUTO-ENTMCNC: 34 G/DL (ref 31–36)
MCV RBC AUTO: 96 FL (ref 80–100)
MONOCYTES # BLD: 0.4 K/UL (ref 0–1.3)
MONOCYTES NFR BLD: 9.6 %
NEUTROPHILS # BLD: 2.1 K/UL (ref 1.7–7.7)
NEUTROPHILS NFR BLD: 49.6 %
PLATELET # BLD AUTO: 239 K/UL (ref 135–450)
PMV BLD AUTO: 8.7 FL (ref 5–10.5)
POTASSIUM SERPL-SCNC: 4.6 MMOL/L (ref 3.5–5.1)
PROT SERPL-MCNC: 6.2 G/DL (ref 6.4–8.2)
RBC # BLD AUTO: 4.14 M/UL (ref 4–5.2)
SODIUM SERPL-SCNC: 140 MMOL/L (ref 136–145)
TRIGL SERPL-MCNC: 64 MG/DL (ref 0–150)
TSH SERPL DL<=0.005 MIU/L-ACNC: 1.2 UIU/ML (ref 0.27–4.2)
VLDLC SERPL CALC-MCNC: 13 MG/DL
WBC # BLD AUTO: 4.2 K/UL (ref 4–11)

## 2023-08-15 ENCOUNTER — OFFICE VISIT (OUTPATIENT)
Dept: FAMILY MEDICINE CLINIC | Age: 78
End: 2023-08-15

## 2023-08-15 VITALS
SYSTOLIC BLOOD PRESSURE: 122 MMHG | BODY MASS INDEX: 24.66 KG/M2 | OXYGEN SATURATION: 95 % | HEART RATE: 62 BPM | HEIGHT: 65 IN | WEIGHT: 148 LBS | DIASTOLIC BLOOD PRESSURE: 74 MMHG

## 2023-08-15 DIAGNOSIS — G89.29 CHRONIC BILATERAL LOW BACK PAIN WITHOUT SCIATICA: ICD-10-CM

## 2023-08-15 DIAGNOSIS — R41.3 MEMORY DISORDER: ICD-10-CM

## 2023-08-15 DIAGNOSIS — I10 ESSENTIAL HYPERTENSION: ICD-10-CM

## 2023-08-15 DIAGNOSIS — K21.9 GASTROESOPHAGEAL REFLUX DISEASE, UNSPECIFIED WHETHER ESOPHAGITIS PRESENT: ICD-10-CM

## 2023-08-15 DIAGNOSIS — G47.00 INSOMNIA, UNSPECIFIED TYPE: ICD-10-CM

## 2023-08-15 DIAGNOSIS — G47.33 OSA (OBSTRUCTIVE SLEEP APNEA): ICD-10-CM

## 2023-08-15 DIAGNOSIS — Z00.00 MEDICARE ANNUAL WELLNESS VISIT, SUBSEQUENT: Primary | ICD-10-CM

## 2023-08-15 DIAGNOSIS — C18.4 MALIGNANT NEOPLASM OF TRANSVERSE COLON (HCC): ICD-10-CM

## 2023-08-15 DIAGNOSIS — D47.2 IGA GAMMOPATHY: ICD-10-CM

## 2023-08-15 DIAGNOSIS — M54.50 CHRONIC BILATERAL LOW BACK PAIN WITHOUT SCIATICA: ICD-10-CM

## 2023-08-15 RX ORDER — LISINOPRIL 30 MG/1
30 TABLET ORAL DAILY
Qty: 90 TABLET | Refills: 3 | Status: SHIPPED | OUTPATIENT
Start: 2023-08-15

## 2023-08-15 RX ORDER — LISINOPRIL 30 MG/1
TABLET ORAL
Qty: 90 TABLET | Refills: 2 | Status: SHIPPED | OUTPATIENT
Start: 2023-08-15

## 2023-08-15 ASSESSMENT — PATIENT HEALTH QUESTIONNAIRE - PHQ9
1. LITTLE INTEREST OR PLEASURE IN DOING THINGS: 0
SUM OF ALL RESPONSES TO PHQ QUESTIONS 1-9: 0
SUM OF ALL RESPONSES TO PHQ QUESTIONS 1-9: 0
SUM OF ALL RESPONSES TO PHQ9 QUESTIONS 1 & 2: 0
SUM OF ALL RESPONSES TO PHQ QUESTIONS 1-9: 0
SUM OF ALL RESPONSES TO PHQ QUESTIONS 1-9: 0
2. FEELING DOWN, DEPRESSED OR HOPELESS: 0

## 2023-08-15 NOTE — TELEPHONE ENCOUNTER
Medication:   Requested Prescriptions     Pending Prescriptions Disp Refills    lisinopril (PRINIVIL;ZESTRIL) 30 MG tablet [Pharmacy Med Name: LISINOPRIL TABS 30MG] 90 tablet 3     Sig: TAKE 1 TABLET DAILY       Last Filled:      Patient Phone Number: [phone] (home)     Last appt: 4/18/2023   Next appt: 8/15/2023    Lab Results   Component Value Date     08/10/2023    K 4.6 08/10/2023     08/10/2023    CO2 26 08/10/2023    BUN 11 08/10/2023    CREATININE 0.7 08/10/2023    GLUCOSE 104 (H) 08/10/2023    CALCIUM 9.6 08/10/2023    PROT 6.2 (L) 08/10/2023    LABALBU 4.1 08/10/2023    BILITOT 0.4 08/10/2023    ALKPHOS 59 08/10/2023    AST 17 08/10/2023    ALT 13 08/10/2023    LABGLOM >60 08/10/2023    GFRAA >60 08/23/2022    AGRATIO 2.0 08/10/2023    GLOB 1.9 07/06/2021

## 2023-08-15 NOTE — PROGRESS NOTES
Medicare Annual Wellness Visit    Khadra Grey is here for Medicare AWV    Assessment & Plan   Medicare annual wellness visit, subsequent  Return 1 year  Insomnia, unspecified type  OARRS report reviewed and no inconsistencies noted   The patient understands the risks of dependency/addiction with zolpidem and will take as little as possible and discontinue as soon as possible   Essential hypertension  -     lisinopril (PRINIVIL;ZESTRIL) 30 MG tablet; Take 1 tablet by mouth daily  Well-controlled. Continue current treatment. Home blood pressure checks. Return 4 months  Malignant neoplasm of transverse colon [facility])  Patient is followed by Dr. Steve Black, gastroenterology. She is no longer followed by oncology  IgA gammopathy  Patient states this is not an issue and is no longer followed by oncology  ADONAY (obstructive sleep apnea)  -     Jose Campbell MD, Sleep Medicine, [facility]    Memory disorder  Patient states this is not an issue and may have been related to alcohol. Chronic bilateral low back pain without sciatica  Patient does water aerobics and states this is a minimal if any issue  Gastroesophageal reflux disease, unspecified whether esophagitis present  Patient is followed by Dr. Steve Black as noted above. Recommendations for Preventive Services Due: see orders and patient instructions/AVS.  Recommended screening schedule for the next 5-10 years is provided to the patient in written form: see Patient Instructions/AVS.    Health Maintenance reviewed with the patient: Flu shot after September 1. COVID: Patient thinks she is up-to-date on boosters but will check and if not we will get the next booster. Return in about 4 months (around 12/15/2023). Subjective   The following acute and/or chronic problems were also addressed today:  See A/P  Patient's complete Health Risk Assessment and screening values have been reviewed and are found in Flowsheets.  The following

## 2023-10-20 ENCOUNTER — OFFICE VISIT (OUTPATIENT)
Dept: SLEEP MEDICINE | Age: 78
End: 2023-10-20
Payer: MEDICARE

## 2023-10-20 VITALS
BODY MASS INDEX: 24.32 KG/M2 | OXYGEN SATURATION: 97 % | DIASTOLIC BLOOD PRESSURE: 80 MMHG | HEART RATE: 62 BPM | SYSTOLIC BLOOD PRESSURE: 130 MMHG | WEIGHT: 146 LBS | HEIGHT: 65 IN | RESPIRATION RATE: 18 BRPM

## 2023-10-20 DIAGNOSIS — I10 ESSENTIAL HYPERTENSION: ICD-10-CM

## 2023-10-20 DIAGNOSIS — R06.83 SNORING: ICD-10-CM

## 2023-10-20 DIAGNOSIS — G47.33 OSA (OBSTRUCTIVE SLEEP APNEA): Primary | ICD-10-CM

## 2023-10-20 DIAGNOSIS — F51.04 CHRONIC INSOMNIA: ICD-10-CM

## 2023-10-20 PROCEDURE — 3075F SYST BP GE 130 - 139MM HG: CPT | Performed by: PSYCHIATRY & NEUROLOGY

## 2023-10-20 PROCEDURE — 1123F ACP DISCUSS/DSCN MKR DOCD: CPT | Performed by: PSYCHIATRY & NEUROLOGY

## 2023-10-20 PROCEDURE — 99204 OFFICE O/P NEW MOD 45 MIN: CPT | Performed by: PSYCHIATRY & NEUROLOGY

## 2023-10-20 PROCEDURE — 3079F DIAST BP 80-89 MM HG: CPT | Performed by: PSYCHIATRY & NEUROLOGY

## 2023-10-20 ASSESSMENT — SLEEP AND FATIGUE QUESTIONNAIRES
HOW LIKELY ARE YOU TO NOD OFF OR FALL ASLEEP WHILE SITTING AND READING: 0
HOW LIKELY ARE YOU TO NOD OFF OR FALL ASLEEP WHILE LYING DOWN TO REST IN THE AFTERNOON WHEN CIRCUMSTANCES PERMIT: 0
ESS TOTAL SCORE: 0
NECK CIRCUMFERENCE (INCHES): 14.5
HOW LIKELY ARE YOU TO NOD OFF OR FALL ASLEEP WHEN YOU ARE A PASSENGER IN A CAR FOR AN HOUR WITHOUT A BREAK: 0
HOW LIKELY ARE YOU TO NOD OFF OR FALL ASLEEP WHILE WATCHING TV: 0
HOW LIKELY ARE YOU TO NOD OFF OR FALL ASLEEP WHILE SITTING INACTIVE IN A PUBLIC PLACE: 0
HOW LIKELY ARE YOU TO NOD OFF OR FALL ASLEEP WHILE SITTING AND TALKING TO SOMEONE: 0
HOW LIKELY ARE YOU TO NOD OFF OR FALL ASLEEP IN A CAR, WHILE STOPPED FOR A FEW MINUTES IN TRAFFIC: 0
HOW LIKELY ARE YOU TO NOD OFF OR FALL ASLEEP WHILE SITTING QUIETLY AFTER LUNCH WITHOUT ALCOHOL: 0

## 2023-10-20 ASSESSMENT — ENCOUNTER SYMPTOMS
RESPIRATORY NEGATIVE: 1
ALLERGIC/IMMUNOLOGIC NEGATIVE: 1
GASTROINTESTINAL NEGATIVE: 1
EYES NEGATIVE: 1

## 2023-10-20 NOTE — PATIENT INSTRUCTIONS
Orders Placed This Encounter   Procedures    Home Sleep Study     Standing Status:   Future     Standing Expiration Date:   10/20/2024     Order Specific Question:   Location For Sleep Study     Answer:   [place]     Order Specific Question:   Select Sleep Lab Location     Answer:   [facility]    Sleep Study with PAP Titration     Standing Status:   Future     Standing Expiration Date:   10/20/2024     Order Specific Question:   Sleep Study Titration Type     Answer:   CPAP     Order Specific Question:   Location For Sleep Study     Answer:   [place]     Order Specific Question:   Select Sleep Lab Location     Answer:   [facility]

## 2023-10-20 NOTE — PROGRESS NOTES
Age-related osteoporosis without current pathological fracture       Past Medical History:   Diagnosis Date    Age-related osteoporosis without current pathological fracture 03/30/2022    Esophagus, Khanna's     Essential hypertension 11/28/2016    GERD (gastroesophageal reflux disease)     HH (hiatus hernia) 07/25/2011    small    IBS (irritable bowel syndrome)     Insomnia     Memory disorder 11/28/2016    TMJ (temporomandibular joint disorder)        Past Surgical History:   Procedure Laterality Date    COLON SURGERY  04/10/2017    transverse colectomy - Dr. Moni Wright     COLONOSCOPY  2000,2003,2006,11,3/5/18    5 years: moderate pan colonic diverticulosis, transverse colon anastomosis, Dr Aaron Valerio    COLONOSCOPY N/A 01/03/2023    Dr Aaron Valerio: Diverticulosis: 5 years    EYE SURGERY      cataract    JOINT REPLACEMENT Right 11/2017    SINUS SURGERY  2009    TONSILLECTOMY      TOTAL HIP ARTHROPLASTY Left     2016    UPPER GASTROINTESTINAL ENDOSCOPY  8417,8763    UPPER GASTROINTESTINAL ENDOSCOPY  07/25/2011    with biopsy, and with esophageal balloon dilation    UPPER GASTROINTESTINAL ENDOSCOPY N/A 11/30/2018    EGD DILATION BALLOON performed by Sarah Chacon MD at [facility] ENDOSCOPY N/A 06/05/2023    Dr. Aaron Valerio       Family History   Problem Relation Age of Onset    Colon Cancer Mother     Other Father         diverticulitis    Hypertension Father        Review of Systems   Constitutional: Negative. HENT: Negative. Eyes: Negative. Respiratory: Negative. Cardiovascular: Negative. Gastrointestinal: Negative. Endocrine: Negative. Genitourinary: Negative. Musculoskeletal: Negative. Allergic/Immunologic: Negative. Neurological: Negative. Hematological:  Bruises/bleeds easily.        Objective:     Vitals:  Weight BMI Neck circumference    Wt Readings from Last 3 Encounters:   10/20/23 66.2 kg (146 lb)   08/15/23 67.1 kg (148 lb)   04/18/23 67.1 kg (148

## 2023-10-23 DIAGNOSIS — M54.50 CHRONIC BILATERAL LOW BACK PAIN WITHOUT SCIATICA: ICD-10-CM

## 2023-10-23 DIAGNOSIS — G89.29 CHRONIC BILATERAL LOW BACK PAIN WITHOUT SCIATICA: ICD-10-CM

## 2023-10-23 RX ORDER — DULOXETIN HYDROCHLORIDE 30 MG/1
CAPSULE, DELAYED RELEASE ORAL
Qty: 90 CAPSULE | Refills: 3 | Status: SHIPPED | OUTPATIENT
Start: 2023-10-23

## 2023-10-28 ENCOUNTER — IMMUNIZATION (OUTPATIENT)
Dept: FAMILY MEDICINE CLINIC | Age: 78
End: 2023-10-28

## 2023-10-28 DIAGNOSIS — Z23 NEED FOR VACCINATION: Primary | ICD-10-CM

## 2023-11-03 ENCOUNTER — HOSPITAL ENCOUNTER (OUTPATIENT)
Dept: SLEEP CENTER | Age: 78
Discharge: HOME | End: 2023-11-03

## 2023-11-03 DIAGNOSIS — G47.33 OSA (OBSTRUCTIVE SLEEP APNEA): ICD-10-CM

## 2023-11-06 PROBLEM — G47.33 OSA (OBSTRUCTIVE SLEEP APNEA): Status: ACTIVE | Noted: 2023-11-06

## 2023-11-06 PROBLEM — R09.02 HYPOXEMIA: Status: ACTIVE | Noted: 2023-11-06

## 2023-11-07 ENCOUNTER — TELEPHONE (OUTPATIENT)
Dept: PULMONOLOGY | Age: 78
End: 2023-11-07

## 2023-11-07 NOTE — TELEPHONE ENCOUNTER
Sleep study showed mild ADONAY. AHI was 9.3  per hr. And O2 Desaturations to 82%. Dr Aysha Ballesteros titration.       Transferred to the sleep lab

## 2023-11-15 ENCOUNTER — HOSPITAL ENCOUNTER (OUTPATIENT)
Dept: SLEEP CENTER | Age: 78
Discharge: HOME | End: 2023-11-15
Payer: MEDICARE

## 2023-11-15 DIAGNOSIS — G47.33 OSA (OBSTRUCTIVE SLEEP APNEA): ICD-10-CM

## 2023-11-15 PROCEDURE — 95811 POLYSOM 6/>YRS CPAP 4/> PARM: CPT

## 2023-11-17 ENCOUNTER — TELEPHONE (OUTPATIENT)
Dept: SLEEP MEDICINE | Age: 78
End: 2023-11-17

## 2023-11-17 NOTE — TELEPHONE ENCOUNTER
Titration study shows adequate control of the events at a CPAP pressure of 13 cm.       DME:     Patient will need appointment 31-90 days after starting new machine    Pt l/m anthony

## 2023-12-06 ENCOUNTER — TELEPHONE (OUTPATIENT)
Dept: PULMONOLOGY | Age: 78
End: 2023-12-06

## 2023-12-06 NOTE — TELEPHONE ENCOUNTER
Pt states she used her machine for the first time last night and struggled with the mask  Pt wants to know if she stops by today if you can show her how to wear the mask?     Please advise

## 2023-12-15 ENCOUNTER — OFFICE VISIT (OUTPATIENT)
Dept: FAMILY MEDICINE CLINIC | Age: 78
End: 2023-12-15

## 2023-12-15 VITALS
DIASTOLIC BLOOD PRESSURE: 60 MMHG | WEIGHT: 148 LBS | SYSTOLIC BLOOD PRESSURE: 120 MMHG | BODY MASS INDEX: 24.66 KG/M2 | HEIGHT: 65 IN

## 2023-12-15 DIAGNOSIS — G62.9 NEUROPATHY: ICD-10-CM

## 2023-12-15 DIAGNOSIS — G47.33 OSA (OBSTRUCTIVE SLEEP APNEA): ICD-10-CM

## 2023-12-15 DIAGNOSIS — I10 ESSENTIAL HYPERTENSION: Primary | ICD-10-CM

## 2023-12-15 DIAGNOSIS — G47.00 INSOMNIA, UNSPECIFIED TYPE: ICD-10-CM

## 2023-12-15 RX ORDER — GABAPENTIN 100 MG/1
100 CAPSULE ORAL 3 TIMES DAILY
Qty: 270 CAPSULE | Refills: 1 | Status: SHIPPED | OUTPATIENT
Start: 2023-12-15 | End: 2024-06-12

## 2023-12-15 NOTE — PROGRESS NOTES
thyromegaly  Lungs: Chest is clear; no wheezes or rales. Heart: S1 and S2 normal, no murmurs, clicks, gallops or rubs. Regular rate and rhythm. Ext[de-identified] No edema  Lab review: 8/2023. Assessment/Plan:    Andie Arriola was seen today for 3 month follow-up. Diagnoses and all orders for this visit:    Essential hypertension  Stable/Controlled  Continue current treatment  Home BP checks  Return 4 months    Insomnia, unspecified type  OARRS report reviewed and no inconsistencies noted   The patient understands the risks of dependency/addiction with [place] and will take as little as possible and discontinue as soon as possible   ADONAY (obstructive sleep apnea)  The patient will continue to try CPAP and follow up with Pulmonary  Neuropathy  Trial of  -     gabapentin (NEURONTIN) 100 MG capsule; Take 1 capsule by mouth 3 times daily for 180 days.  Intended supply: 90 days

## 2024-01-22 DIAGNOSIS — G47.00 INSOMNIA, UNSPECIFIED TYPE: ICD-10-CM

## 2024-01-22 RX ORDER — ZOLPIDEM TARTRATE 6.25 MG/1
6.25 TABLET, FILM COATED, EXTENDED RELEASE ORAL NIGHTLY
Qty: 90 TABLET | Refills: 0 | Status: SHIPPED | OUTPATIENT
Start: 2024-01-22 | End: 2024-04-21

## 2024-01-22 NOTE — TELEPHONE ENCOUNTER
Medication and Quantity requested:      zolpidem (AMBIEN CR) 6.25 MG extended release tablet [[id number]]  ENDED     90 Day Supply     Last Visit    12/15/2023    Pharmacy and phone number updated in EPIC:      [facility]   BERTHARIMARIA DE JESUS ID #  [id number]        PATIENT HAS NEW INSURANCE AND IT GOES THROUGH     [employer] MED[employer]ARE AND MIGHT NEED A PA FOR THIS MEDICATION  AND PATIENT IS LEAVING FOR OUT TOWN 02/02/2024 AND WILL NEED MEDICATION BEFORE SHE LEAVES

## 2024-01-23 ENCOUNTER — HOSPITAL ENCOUNTER (OUTPATIENT)
Dept: MAMMOGRAPHY | Age: 79
Discharge: HOME | End: 2024-01-23
Payer: MEDICARE

## 2024-01-23 VITALS — BODY MASS INDEX: 25.1 KG/M2 | HEIGHT: 64 IN | WEIGHT: 147 LBS

## 2024-01-23 DIAGNOSIS — Z12.31 SCREENING MAMMOGRAM FOR BREAST CANCER: ICD-10-CM

## 2024-01-23 PROCEDURE — 77063 BREAST TOMOSYNTHESIS BI: CPT

## 2024-01-29 ENCOUNTER — TELEPHONE (OUTPATIENT)
Dept: PULMONOLOGY | Age: 79
End: 2024-01-29

## 2024-01-29 NOTE — TELEPHONE ENCOUNTER
I called patient to schedule a follow up appt with doctor. She returned my call, she is no longer using the cpap, she sent it back to [facility]. She could not tolerate the mask but she is a mouth breather and cannot use the nasal pillows. She was unable to sleep.

## 2024-03-18 ENCOUNTER — TELEPHONE (OUTPATIENT)
Dept: FAMILY MEDICINE CLINIC | Age: 79
End: 2024-03-18

## 2024-03-18 DIAGNOSIS — K21.9 GASTROESOPHAGEAL REFLUX DISEASE, UNSPECIFIED WHETHER ESOPHAGITIS PRESENT: ICD-10-CM

## 2024-03-18 RX ORDER — PANTOPRAZOLE SODIUM 40 MG/1
40 TABLET, DELAYED RELEASE ORAL
Qty: 90 TABLET | Refills: 2 | Status: SHIPPED | OUTPATIENT
Start: 2024-03-18

## 2024-04-23 ENCOUNTER — TELEPHONE (OUTPATIENT)
Dept: FAMILY MEDICINE CLINIC | Age: 79
End: 2024-04-23

## 2024-04-23 DIAGNOSIS — I10 ESSENTIAL HYPERTENSION: Primary | ICD-10-CM

## 2024-04-23 DIAGNOSIS — G47.00 INSOMNIA, UNSPECIFIED TYPE: ICD-10-CM

## 2024-04-23 RX ORDER — ZOLPIDEM TARTRATE 6.25 MG/1
6.25 TABLET, FILM COATED, EXTENDED RELEASE ORAL NIGHTLY
Qty: 90 TABLET | Refills: 0 | Status: SHIPPED | OUTPATIENT
Start: 2024-04-23 | End: 2024-07-22

## 2024-04-23 NOTE — TELEPHONE ENCOUNTER
Medication and Quantity requested:        zolpidem (AMBIEN CR) 6.25 MG extended release tablet [[id number]]  ENDED         Last Visit  12/15/2023      Pharmacy and phone number updated in [facility]:  yes    [facility]

## 2024-04-23 NOTE — TELEPHONE ENCOUNTER
Lov 12/15/23  Lrf 90 0 1/22/24 Medication:   Requested Prescriptions     Pending Prescriptions Disp Refills    zolpidem (AMBIEN CR) 6.25 MG extended release tablet 90 tablet 0     Sig: Take 1 tablet by mouth nightly for 90 days. at bedtime. Max Daily Amount: 6.25 mg        Last Filled:      Patient Phone Number: [phone] (home)     Last appt: 12/15/2023   Next appt: 4/23/2024    Last OARRS:       1/22/2024     9:24 PM   RX Monitoring   Periodic Controlled Substance Monitoring No signs of potential drug abuse or diversion identified.

## 2024-04-26 DIAGNOSIS — G47.00 INSOMNIA, UNSPECIFIED TYPE: ICD-10-CM

## 2024-04-26 RX ORDER — ZOLPIDEM TARTRATE 6.25 MG/1
6.25 TABLET, FILM COATED, EXTENDED RELEASE ORAL NIGHTLY
Qty: 90 TABLET | Refills: 0 | OUTPATIENT
Start: 2024-04-26 | End: 2024-07-25

## 2024-04-26 NOTE — TELEPHONE ENCOUNTER
Medication:   Requested Prescriptions     Pending Prescriptions Disp Refills    zolpidem (AMBIEN CR) 6.25 MG extended release tablet 90 tablet 0     Sig: Take 1 tablet by mouth nightly for 90 days. at bedtime. Max Daily Amount: 6.25 mg        Last Filled:  04/23/24 90 tablet 0 refill    Patient Phone Number: [phone] (home)     Last appt: 12/15/2023   Next appt: 8/16/2024    Last OARRS:       4/23/2024    11:08 AM   RX Monitoring   Periodic Controlled Substance Monitoring No signs of potential drug abuse or diversion identified.

## 2024-04-26 NOTE — TELEPHONE ENCOUNTER
Medication and Quantity requested: zolpidem (AMBIEN CR) 6.25 MG extended release tablet    Patient called and said pharmacy needed more info before they can fill medication. Patient is fine with paying full price if he calls it in to local pharmacy.    Last Visit  12/15/2023    Pharmacy and phone number updated in [facility]:  [facility] [place] - [facility]

## 2024-04-27 ENCOUNTER — PATIENT MESSAGE (OUTPATIENT)
Dept: FAMILY MEDICINE CLINIC | Age: 79
End: 2024-04-27

## 2024-04-27 DIAGNOSIS — G47.00 INSOMNIA, UNSPECIFIED TYPE: ICD-10-CM

## 2024-04-29 RX ORDER — ZOLPIDEM TARTRATE 6.25 MG/1
6.25 TABLET, FILM COATED, EXTENDED RELEASE ORAL NIGHTLY
Qty: 90 TABLET | Refills: 0 | Status: SHIPPED | OUTPATIENT
Start: 2024-04-29 | End: 2024-07-28

## 2024-04-29 NOTE — TELEPHONE ENCOUNTER
From: Malorie Lomeli  To: Dr. Noe Aguirre  Sent: 4/27/2024 8:57 AM EDT  Subject: Ambien    [facility] has been terrible in not renewing my prescription. This is the 4th time that they have asked me to have you give me a new one or send them an authorization. Meanwhile, I have not been to sleep. Please advise me what to do .  Malorie

## 2024-04-29 NOTE — TELEPHONE ENCOUNTER
Call [facility] and find out what is going on.  If they tell you they do not have it you can let them know about the confirmation below which has now been 6 days.  You can also see if the patient wants it sent somewhere else    zolpidem (AMBIEN CR) 6.25 MG extended release tablet 90 tablet 0 4/23/2024 7/22/2024    Sig - Route: Take 1 tablet by mouth nightly for 90 days. at bedtime. Max Daily Amount: 6.25 mg - Oral    Sent to pharmacy as: Zolpidem Tartrate ER 6.25 MG Oral Tablet Extended Release (AMBIEN CR)    Notes to Pharmacy: May fill when time    E-Prescribing Status: Receipt confirmed by pharmacy (4/23/2024 11:09 AM EDT)

## 2024-04-30 ENCOUNTER — TELEPHONE (OUTPATIENT)
Dept: FAMILY MEDICINE CLINIC | Age: 79
End: 2024-04-30

## 2024-06-03 DIAGNOSIS — I10 ESSENTIAL HYPERTENSION: ICD-10-CM

## 2024-06-03 LAB
ALBUMIN SERPL-MCNC: 4.2 G/DL (ref 3.4–5)
ALBUMIN/GLOB SERPL: 2.2 {RATIO} (ref 1.1–2.2)
ALP SERPL-CCNC: 67 U/L (ref 40–129)
ALT SERPL-CCNC: 14 U/L (ref 10–40)
ANION GAP SERPL CALCULATED.3IONS-SCNC: 12 MMOL/L (ref 3–16)
AST SERPL-CCNC: 19 U/L (ref 15–37)
BASOPHILS # BLD: 0 K/UL (ref 0–0.2)
BASOPHILS NFR BLD: 1 %
BILIRUB SERPL-MCNC: 0.6 MG/DL (ref 0–1)
BUN SERPL-MCNC: 19 MG/DL (ref 7–20)
CALCIUM SERPL-MCNC: 9.8 MG/DL (ref 8.3–10.6)
CHLORIDE SERPL-SCNC: 105 MMOL/L (ref 99–110)
CHOLEST SERPL-MCNC: 198 MG/DL (ref 0–199)
CO2 SERPL-SCNC: 22 MMOL/L (ref 21–32)
CREAT SERPL-MCNC: 0.6 MG/DL (ref 0.6–1.2)
DEPRECATED RDW RBC AUTO: 13.3 % (ref 12.4–15.4)
EOSINOPHIL # BLD: 0.2 K/UL (ref 0–0.6)
EOSINOPHIL NFR BLD: 3.6 %
GFR SERPLBLD CREATININE-BSD FMLA CKD-EPI: >90 ML/MIN/{1.73_M2}
GLUCOSE SERPL-MCNC: 91 MG/DL (ref 70–99)
HCT VFR BLD AUTO: 38.5 % (ref 36–48)
HDLC SERPL-MCNC: 81 MG/DL (ref 40–60)
HGB BLD-MCNC: 13.1 G/DL (ref 12–16)
LDLC SERPL CALC-MCNC: 104 MG/DL
LYMPHOCYTES # BLD: 1.5 K/UL (ref 1–5.1)
LYMPHOCYTES NFR BLD: 37.1 %
MCH RBC QN AUTO: 31.9 PG (ref 26–34)
MCHC RBC AUTO-ENTMCNC: 33.9 G/DL (ref 31–36)
MCV RBC AUTO: 94.1 FL (ref 80–100)
MONOCYTES # BLD: 0.5 K/UL (ref 0–1.3)
MONOCYTES NFR BLD: 12.4 %
NEUTROPHILS # BLD: 1.9 K/UL (ref 1.7–7.7)
NEUTROPHILS NFR BLD: 45.9 %
PLATELET # BLD AUTO: 229 K/UL (ref 135–450)
PMV BLD AUTO: 9.2 FL (ref 5–10.5)
POTASSIUM SERPL-SCNC: 4.9 MMOL/L (ref 3.5–5.1)
PROT SERPL-MCNC: 6.1 G/DL (ref 6.4–8.2)
RBC # BLD AUTO: 4.09 M/UL (ref 4–5.2)
SODIUM SERPL-SCNC: 139 MMOL/L (ref 136–145)
TRIGL SERPL-MCNC: 63 MG/DL (ref 0–150)
TSH SERPL DL<=0.005 MIU/L-ACNC: 0.86 UIU/ML (ref 0.27–4.2)
VLDLC SERPL CALC-MCNC: 13 MG/DL
WBC # BLD AUTO: 4.1 K/UL (ref 4–11)

## 2024-07-22 DIAGNOSIS — G47.00 INSOMNIA, UNSPECIFIED TYPE: ICD-10-CM

## 2024-07-22 RX ORDER — ZOLPIDEM TARTRATE 6.25 MG/1
6.25 TABLET, FILM COATED, EXTENDED RELEASE ORAL NIGHTLY
Qty: 90 TABLET | Refills: 0 | Status: SHIPPED | OUTPATIENT
Start: 2024-07-22 | End: 2024-10-20

## 2024-07-22 NOTE — TELEPHONE ENCOUNTER
Medication and Quantity requested:        zolpidem (AMBIEN CR) 6.25 MG extended release tablet [[id number]]         Last Visit  12/15/2023      Pharmacy and phone number updated in [facility]:  yes    [facility]

## 2024-07-22 NOTE — TELEPHONE ENCOUNTER
Medication:   Requested Prescriptions     Pending Prescriptions Disp Refills    zolpidem (AMBIEN CR) 6.25 MG extended release tablet 90 tablet 0     Sig: Take 1 tablet by mouth nightly for 90 days. at bedtime. Max Daily Amount: 6.25 mg        Last Filled:  04/23/2024, 90, 0    Patient Phone Number: [phone] (home)     Last appt: 12/15/2023   Next appt: 8/16/2024    Last OARRS:       4/23/2024    11:08 AM   RX Monitoring   Periodic Controlled Substance Monitoring No signs of potential drug abuse or diversion identified.

## 2024-08-08 RX ORDER — LISINOPRIL 30 MG/1
30 TABLET ORAL DAILY
Qty: 90 TABLET | Refills: 1 | Status: SHIPPED | OUTPATIENT
Start: 2024-08-08

## 2024-08-08 NOTE — TELEPHONE ENCOUNTER
Medication:   Requested Prescriptions     Pending Prescriptions Disp Refills    lisinopril (PRINIVIL;ZESTRIL) 30 MG tablet [Pharmacy Med Name: LISINOPRIL TAB 30MG] 90 tablet 3     Sig: TAKE 1 TABLET DAILY        Last Filled:  08/15/2023, 90, 2    Patient Phone Number: [phone] (home)     Last appt: 12/15/2023   Next appt: 8/16/2024    Last OARRS:       4/23/2024    11:08 AM   RX Monitoring   Periodic Controlled Substance Monitoring No signs of potential drug abuse or diversion identified.

## 2024-08-16 ENCOUNTER — OFFICE VISIT (OUTPATIENT)
Dept: FAMILY MEDICINE CLINIC | Age: 79
End: 2024-08-16

## 2024-08-16 VITALS
DIASTOLIC BLOOD PRESSURE: 70 MMHG | BODY MASS INDEX: 25.33 KG/M2 | HEART RATE: 59 BPM | WEIGHT: 152 LBS | SYSTOLIC BLOOD PRESSURE: 110 MMHG | HEIGHT: 65 IN | OXYGEN SATURATION: 96 %

## 2024-08-16 DIAGNOSIS — Z00.00 MEDICARE ANNUAL WELLNESS VISIT, SUBSEQUENT: Primary | ICD-10-CM

## 2024-08-16 DIAGNOSIS — G62.9 NEUROPATHY: ICD-10-CM

## 2024-08-16 DIAGNOSIS — G47.00 INSOMNIA, UNSPECIFIED TYPE: ICD-10-CM

## 2024-08-16 DIAGNOSIS — G47.33 OSA (OBSTRUCTIVE SLEEP APNEA): ICD-10-CM

## 2024-08-16 DIAGNOSIS — K22.719 BARRETT'S ESOPHAGUS WITH DYSPLASIA: ICD-10-CM

## 2024-08-16 DIAGNOSIS — C18.4 MALIGNANT NEOPLASM OF TRANSVERSE COLON (HCC): ICD-10-CM

## 2024-08-16 DIAGNOSIS — I10 ESSENTIAL HYPERTENSION: ICD-10-CM

## 2024-08-16 DIAGNOSIS — R39.9 UTI SYMPTOMS: ICD-10-CM

## 2024-08-16 LAB
BILIRUBIN, POC: NEGATIVE
BLOOD URINE, POC: NEGATIVE
CLARITY, POC: NORMAL
COLOR, POC: NORMAL
GLUCOSE URINE, POC: NEGATIVE
KETONES, POC: NEGATIVE
LEUKOCYTE EST, POC: NORMAL
NITRITE, POC: POSITIVE
PH, POC: 5.5
PROTEIN, POC: NEGATIVE
SPECIFIC GRAVITY, POC: 1.02
UROBILINOGEN, POC: 0.2

## 2024-08-16 SDOH — ECONOMIC STABILITY: FOOD INSECURITY: WITHIN THE PAST 12 MONTHS, YOU WORRIED THAT YOUR FOOD WOULD RUN OUT BEFORE YOU GOT MONEY TO BUY MORE.: NEVER TRUE

## 2024-08-16 SDOH — ECONOMIC STABILITY: FOOD INSECURITY: WITHIN THE PAST 12 MONTHS, THE FOOD YOU BOUGHT JUST DIDN'T LAST AND YOU DIDN'T HAVE MONEY TO GET MORE.: NEVER TRUE

## 2024-08-16 SDOH — ECONOMIC STABILITY: INCOME INSECURITY: HOW HARD IS IT FOR YOU TO PAY FOR THE VERY BASICS LIKE FOOD, HOUSING, MEDICAL CARE, AND HEATING?: NOT HARD AT ALL

## 2024-08-16 ASSESSMENT — PATIENT HEALTH QUESTIONNAIRE - PHQ9
1. LITTLE INTEREST OR PLEASURE IN DOING THINGS: NOT AT ALL
SUM OF ALL RESPONSES TO PHQ QUESTIONS 1-9: 0
2. FEELING DOWN, DEPRESSED OR HOPELESS: NOT AT ALL
SUM OF ALL RESPONSES TO PHQ9 QUESTIONS 1 & 2: 0
SUM OF ALL RESPONSES TO PHQ QUESTIONS 1-9: 0

## 2024-08-16 ASSESSMENT — LIFESTYLE VARIABLES
HOW OFTEN DO YOU HAVE A DRINK CONTAINING ALCOHOL: 2-3 TIMES A WEEK
HOW MANY STANDARD DRINKS CONTAINING ALCOHOL DO YOU HAVE ON A TYPICAL DAY: 1 OR 2

## 2024-08-16 NOTE — PROGRESS NOTES
deferred to Gynecologist  Breast: deferred to Gynecologist           No Known Allergies  Prior to Visit Medications    Medication Sig Taking? Authorizing Provider   lisinopril (PRINIVIL;ZESTRIL) 30 MG tablet TAKE 1 TABLET DAILY Yes Noe Aguirre MD   zolpidem (AMBIEN CR) 6.25 MG extended release tablet Take 1 tablet by mouth nightly for 90 days. at bedtime. Max Daily Amount: 6.25 mg Yes Noe Aguirre MD   pantoprazole (PROTONIX) 40 MG tablet Take 1 tablet by mouth every morning (before breakfast) Yes Noe Aguirre MD   DULoxetine (CYMBALTA) 30 MG extended release capsule TAKE 1 CAPSULE DAILY Yes Noe Aguirre MD   Pancrelipase, Lip-Prot-Amyl, (ZENPEP) 69691-442292 units CPEP Take by mouth in the morning and at bedtime Yes ProviderFrank MD   ammonium lactate (AMLACTIN) 12 % cream APPLY TO THE AFFECTED AREA(S) DAILY AS NEEDED Yes Noe Aguirre MD   valACYclovir (VALTREX) 500 MG tablet TAKE 2 TABLETS NOW, THEN TAKE 2 TABLETS IN 12 HOURS Yes Noe Aguirre MD   Cholecalciferol (VITAMIN D) 2000 UNITS CAPS capsule Take 1 capsule by mouth daily Yes ProviderFrank MD   gabapentin (NEURONTIN) 100 MG capsule Take 1 capsule by mouth 3 times daily for 180 days. Intended supply: 90 days  Noe Aguirre MD       [facility] (Including outside providers/suppliers regularly involved in providing care):   Patient Care Team:  Noe Aguirre MD as PCP - General (Family Medicine)  No[facility] as PCP [facility]  Juan Ramon Tinajero MD as Consulting Physician (Urology)  Mouna Johnson MD (Rheumatology)  Laurence Linder MD (Obstetrics & Gynecology)  Lan Avelar Jr., MD (Dermatology)  BENJAMIN Ward MD as Consulting Physician (Dermatology)  Channing Davila MD as Consulting Physician (Gastroenterology)  Adrián Khoury MD as Referring Physician (General Surgery)      Reviewed and updated this visit:  Tobacco  Allergies  Meds  Problems  Med Hx  Surg Hx  Soc Hx  Fam Hx

## 2024-08-18 LAB
BACTERIA UR CULT: ABNORMAL
ORGANISM: ABNORMAL

## 2024-08-19 LAB
BACTERIA UR CULT: ABNORMAL
ORGANISM: ABNORMAL

## 2024-08-19 RX ORDER — NITROFURANTOIN 25; 75 MG/1; MG/1
100 CAPSULE ORAL 2 TIMES DAILY
Qty: 14 CAPSULE | Refills: 0 | Status: SHIPPED | OUTPATIENT
Start: 2024-08-19 | End: 2024-08-26

## 2024-09-16 DIAGNOSIS — G89.29 CHRONIC BILATERAL LOW BACK PAIN WITHOUT SCIATICA: ICD-10-CM

## 2024-09-16 DIAGNOSIS — M54.50 CHRONIC BILATERAL LOW BACK PAIN WITHOUT SCIATICA: ICD-10-CM

## 2024-09-16 RX ORDER — DULOXETIN HYDROCHLORIDE 30 MG/1
CAPSULE, DELAYED RELEASE ORAL
Qty: 90 CAPSULE | Refills: 3 | Status: SHIPPED | OUTPATIENT
Start: 2024-09-16

## 2024-10-14 DIAGNOSIS — G47.00 INSOMNIA, UNSPECIFIED TYPE: ICD-10-CM

## 2024-10-15 RX ORDER — ZOLPIDEM TARTRATE 6.25 MG/1
TABLET, FILM COATED, EXTENDED RELEASE ORAL
Qty: 90 TABLET | Refills: 0 | Status: SHIPPED | OUTPATIENT
Start: 2024-10-15 | End: 2024-11-14

## 2024-10-15 NOTE — TELEPHONE ENCOUNTER
Medication:   Requested Prescriptions     Pending Prescriptions Disp Refills    zolpidem (AMBIEN CR) 6.25 MG extended release tablet [Pharmacy Med Name: ZOLPIDEM ER  TAB 6.25MG] 90 tablet 0     Sig: TAKE 1 TABLET NIGHTLY AT   BEDTIME MAXIMUM DAILY      AMOUNT: 6.25 MG.        Last Filled:      Patient Phone Number: [phone] (home)     Last appt: 8/16/2024   Next appt: 12/10/2024    Last OARRS:       8/16/2024    11:20 AM   RX Monitoring   Periodic Controlled Substance Monitoring No signs of potential drug abuse or diversion identified.

## 2024-11-30 DIAGNOSIS — K21.9 GASTROESOPHAGEAL REFLUX DISEASE, UNSPECIFIED WHETHER ESOPHAGITIS PRESENT: ICD-10-CM

## 2024-12-04 RX ORDER — PANTOPRAZOLE SODIUM 40 MG/1
TABLET, DELAYED RELEASE ORAL
Qty: 90 TABLET | Refills: 1 | Status: SHIPPED | OUTPATIENT
Start: 2024-12-04

## 2024-12-10 ENCOUNTER — OFFICE VISIT (OUTPATIENT)
Dept: FAMILY MEDICINE CLINIC | Age: 79
End: 2024-12-10
Payer: MEDICARE

## 2024-12-10 VITALS
BODY MASS INDEX: 24.3 KG/M2 | SYSTOLIC BLOOD PRESSURE: 122 MMHG | HEART RATE: 61 BPM | WEIGHT: 146 LBS | DIASTOLIC BLOOD PRESSURE: 72 MMHG | OXYGEN SATURATION: 96 %

## 2024-12-10 DIAGNOSIS — G47.00 INSOMNIA, UNSPECIFIED TYPE: ICD-10-CM

## 2024-12-10 DIAGNOSIS — I10 ESSENTIAL HYPERTENSION: Primary | ICD-10-CM

## 2024-12-10 PROCEDURE — 99213 OFFICE O/P EST LOW 20 MIN: CPT | Performed by: FAMILY MEDICINE

## 2024-12-10 PROCEDURE — 1123F ACP DISCUSS/DSCN MKR DOCD: CPT | Performed by: FAMILY MEDICINE

## 2024-12-10 PROCEDURE — 3074F SYST BP LT 130 MM HG: CPT | Performed by: FAMILY MEDICINE

## 2024-12-10 PROCEDURE — 1159F MED LIST DOCD IN RCRD: CPT | Performed by: FAMILY MEDICINE

## 2024-12-10 PROCEDURE — 1160F RVW MEDS BY RX/DR IN RCRD: CPT | Performed by: FAMILY MEDICINE

## 2024-12-10 PROCEDURE — 3078F DIAST BP <80 MM HG: CPT | Performed by: FAMILY MEDICINE

## 2024-12-10 RX ORDER — ZOLPIDEM TARTRATE 6.25 MG/1
TABLET, FILM COATED, EXTENDED RELEASE ORAL
Qty: 90 TABLET | Refills: 0 | Status: CANCELLED | OUTPATIENT
Start: 2024-12-10 | End: 2025-01-09

## 2024-12-10 RX ORDER — ZOLPIDEM TARTRATE 6.25 MG/1
6.25 TABLET, FILM COATED, EXTENDED RELEASE ORAL NIGHTLY PRN
Qty: 90 TABLET | Refills: 0 | Status: SHIPPED | OUTPATIENT
Start: 2024-12-10 | End: 2025-03-10

## 2024-12-10 NOTE — PROGRESS NOTES
Subjective:   Malorie Lomeli is a 79 y.o. female with hypertension.  Hypertension ROS: taking medications as instructed, no medication side effects noted, no TIA's, no chest pain at rest or on exertion, no SOB or dyspnea on exertion, no swelling of ankles or feet.  Exercise:Pilates 3 xs per week   New concerns: no new issues.   Home BP: within normal limits     Insomnia: Continues to need Zolpidem or else she does not sleep.  She is aware of all the risks associated with zolpidem particularly associated with females.    Outpatient Medications Marked as Taking for the 12/10/24 encounter (Office Visit) with Noe Aguirre MD   Medication Sig Dispense Refill    zolpidem (AMBIEN CR) 6.25 MG extended release tablet Take 1 tablet by mouth nightly as needed for Sleep for up to 90 days. Max Daily Amount: 6.25 mg 90 tablet 0    pantoprazole (PROTONIX) 40 MG tablet TAKE 1 TABLET EVERY MORNINGBEFORE BREAKFAST 90 tablet 1    DULoxetine (CYMBALTA) 30 MG extended release capsule TAKE 1 CAPSULE DAILY 90 capsule 3    lisinopril (PRINIVIL;ZESTRIL) 30 MG tablet TAKE 1 TABLET DAILY 90 tablet 1    Pancrelipase, Lip-Prot-Amyl, (ZENPEP) 08164-286061 units CPEP Take by mouth in the morning and at bedtime      ammonium lactate (AMLACTIN) 12 % cream APPLY TO THE AFFECTED AREA(S) DAILY AS NEEDED 140 g 0    valACYclovir (VALTREX) 500 MG tablet TAKE 2 TABLETS NOW, THEN TAKE 2 TABLETS IN 12 HOURS 12 tablet 5    Cholecalciferol (VITAMIN D) 2000 UNITS CAPS capsule Take 1 capsule by mouth daily       No Known Allergies    Objective:   /72 (Site: Right Upper Arm, Position: Sitting, Cuff Size: Medium Adult)   Pulse 61   Wt 66.2 kg (146 lb)   SpO2 96%   BMI 24.30 kg/m²    BP: my cuff  120/74                         patient cuff:  the patient did not bring her cuff   Appearance alert and oriented and in no distress.  Skin: warm and dry  Eyes: PERRL  Neck: No JVD, or bruits. No adenopathy or thyromegaly  Lungs: Chest is clear; no wheezes or

## 2024-12-11 ENCOUNTER — TELEPHONE (OUTPATIENT)
Dept: FAMILY MEDICINE CLINIC | Age: 79
End: 2024-12-11

## 2024-12-13 ENCOUNTER — PATIENT MESSAGE (OUTPATIENT)
Dept: FAMILY MEDICINE CLINIC | Age: 79
End: 2024-12-13

## 2024-12-13 DIAGNOSIS — R41.3 MEMORY DISORDER: Primary | ICD-10-CM

## 2024-12-16 RX ORDER — MEMANTINE HYDROCHLORIDE 10 MG/1
10 TABLET ORAL 2 TIMES DAILY
Qty: 180 TABLET | Refills: 1 | Status: SHIPPED | OUTPATIENT
Start: 2024-12-16

## 2025-01-28 ENCOUNTER — HOSPITAL ENCOUNTER (OUTPATIENT)
Dept: MAMMOGRAPHY | Age: 80
Discharge: HOME | End: 2025-01-28
Payer: MEDICARE

## 2025-01-28 DIAGNOSIS — Z12.31 ENCOUNTER FOR SCREENING MAMMOGRAM FOR BREAST CANCER: ICD-10-CM

## 2025-01-28 PROCEDURE — 77063 BREAST TOMOSYNTHESIS BI: CPT

## 2025-02-03 ENCOUNTER — PATIENT MESSAGE (OUTPATIENT)
Dept: FAMILY MEDICINE CLINIC | Age: 80
End: 2025-02-03

## 2025-02-07 ENCOUNTER — PRE-PROCEDURE TELEPHONE (OUTPATIENT)
Dept: WOMENS IMAGING | Age: 80
End: 2025-02-07

## 2025-02-07 ENCOUNTER — HOSPITAL ENCOUNTER (OUTPATIENT)
Dept: WOMENS IMAGING | Age: 80
Discharge: HOME | End: 2025-02-07
Payer: MEDICARE

## 2025-02-07 ENCOUNTER — HOSPITAL ENCOUNTER (OUTPATIENT)
Dept: ULTRASOUND IMAGING | Age: 80
Discharge: HOME | End: 2025-02-07
Payer: MEDICARE

## 2025-02-07 ENCOUNTER — TELEPHONE (OUTPATIENT)
Dept: WOMENS IMAGING | Age: 80
End: 2025-02-07

## 2025-02-07 DIAGNOSIS — R92.8 ABNORMAL MAMMOGRAM: ICD-10-CM

## 2025-02-07 DIAGNOSIS — R92.8 ABNORMAL FINDINGS ON DIAGNOSTIC IMAGING OF BREAST: Primary | ICD-10-CM

## 2025-02-07 PROCEDURE — 77065 DX MAMMO INCL CAD UNI: CPT

## 2025-02-07 PROCEDURE — 76642 ULTRASOUND BREAST LIMITED: CPT

## 2025-02-19 ENCOUNTER — OFFICE VISIT (OUTPATIENT)
Dept: FAMILY MEDICINE CLINIC | Age: 80
End: 2025-02-19

## 2025-02-19 VITALS
DIASTOLIC BLOOD PRESSURE: 74 MMHG | HEART RATE: 84 BPM | BODY MASS INDEX: 24.83 KG/M2 | SYSTOLIC BLOOD PRESSURE: 122 MMHG | WEIGHT: 149 LBS | HEIGHT: 65 IN

## 2025-02-19 DIAGNOSIS — R41.3 MEMORY DISORDER: ICD-10-CM

## 2025-02-19 DIAGNOSIS — Z85.038 HISTORY OF COLON CANCER: ICD-10-CM

## 2025-02-19 DIAGNOSIS — I10 ESSENTIAL HYPERTENSION: Primary | ICD-10-CM

## 2025-02-19 DIAGNOSIS — B00.1 COLD SORE: ICD-10-CM

## 2025-02-19 DIAGNOSIS — G62.9 NEUROPATHY: ICD-10-CM

## 2025-02-19 DIAGNOSIS — K86.81 EXOCRINE PANCREATIC INSUFFICIENCY: ICD-10-CM

## 2025-02-19 DIAGNOSIS — K21.9 GASTROESOPHAGEAL REFLUX DISEASE, UNSPECIFIED WHETHER ESOPHAGITIS PRESENT: ICD-10-CM

## 2025-02-19 DIAGNOSIS — K22.719 BARRETT'S ESOPHAGUS WITH DYSPLASIA: ICD-10-CM

## 2025-02-19 DIAGNOSIS — G47.09 OTHER INSOMNIA: ICD-10-CM

## 2025-02-19 DIAGNOSIS — M81.0 AGE-RELATED OSTEOPOROSIS WITHOUT CURRENT PATHOLOGICAL FRACTURE: ICD-10-CM

## 2025-02-19 PROBLEM — C18.4 MALIGNANT NEOPLASM OF TRANSVERSE COLON (HCC): Status: RESOLVED | Noted: 2020-08-18 | Resolved: 2025-02-19

## 2025-02-19 PROBLEM — R09.02 HYPOXEMIA: Status: RESOLVED | Noted: 2023-11-06 | Resolved: 2025-02-19

## 2025-02-19 RX ORDER — LISINOPRIL 30 MG/1
30 TABLET ORAL DAILY
Qty: 90 TABLET | Refills: 1 | Status: SHIPPED | OUTPATIENT
Start: 2025-02-19

## 2025-02-19 RX ORDER — VALACYCLOVIR HYDROCHLORIDE 500 MG/1
TABLET, FILM COATED ORAL
Qty: 12 TABLET | Refills: 5 | Status: SHIPPED | OUTPATIENT
Start: 2025-02-19

## 2025-02-19 SDOH — ECONOMIC STABILITY: FOOD INSECURITY: WITHIN THE PAST 12 MONTHS, THE FOOD YOU BOUGHT JUST DIDN'T LAST AND YOU DIDN'T HAVE MONEY TO GET MORE.: NEVER TRUE

## 2025-02-19 SDOH — ECONOMIC STABILITY: FOOD INSECURITY: WITHIN THE PAST 12 MONTHS, YOU WORRIED THAT YOUR FOOD WOULD RUN OUT BEFORE YOU GOT MONEY TO BUY MORE.: NEVER TRUE

## 2025-02-19 ASSESSMENT — PATIENT HEALTH QUESTIONNAIRE - PHQ9
1. LITTLE INTEREST OR PLEASURE IN DOING THINGS: NOT AT ALL
1. LITTLE INTEREST OR PLEASURE IN DOING THINGS: NOT AT ALL
SUM OF ALL RESPONSES TO PHQ QUESTIONS 1-9: 0
2. FEELING DOWN, DEPRESSED OR HOPELESS: NOT AT ALL
SUM OF ALL RESPONSES TO PHQ QUESTIONS 1-9: 0
SUM OF ALL RESPONSES TO PHQ QUESTIONS 1-9: 0
SUM OF ALL RESPONSES TO PHQ9 QUESTIONS 1 & 2: 0
2. FEELING DOWN, DEPRESSED OR HOPELESS: NOT AT ALL
SUM OF ALL RESPONSES TO PHQ QUESTIONS 1-9: 0
SUM OF ALL RESPONSES TO PHQ9 QUESTIONS 1 & 2: 0

## 2025-02-19 NOTE — PROGRESS NOTES
Malorie Lomeli is a 79 y.o. female here to establish care. The patient has had a primary care physician in the recent past, Dr. Aguirre.     HPI:  Hx hypertension- has been stable on lisinopril    Hx of colon cancer in 2017 s/p transverse colectomy. Had last colonoscopy in 2023- found diverticulosis. Repeat q5. Sees Dr. Davila     Hx esophageal stricture and Barrets esophagus- last EGD 9/2024. Sees Dr. Davila. Hx hiatal hernia. Hx GERD- on pantoprazole 40mg daily.     Hx EPI- sees GI. Taking zenpep PRN    Hx osteoporosis- Last dexa 3/2022. Prolia was recommended at that time but never started it. Not able to take fosamax or actonel given her GI issues. Goes to pilates regularly, taking calcium and vit D daily. Is open to taking something for this.     Hx memory disorder- has been taking namenda the past few months. Memory problem began years ago.      Hx insomnia- taking ambien nightly for past 40 years     Saw pain management for neuropathy in feet up to knees. Started last fall, was put on gabapentin but didn't really help.     Hx cold sores- taking valtrex PRN    ROS:  Gen:  Denies fever, chills, unintentional weight loss.  HEENT:  Denies cold symptoms, sore throat.  CV:  Denies chest pain or tightness, palpitations, or edema.  Pulm:  Denies shortness of breath, cough.  Abd:  Denies abdominal pain, change in bowel habits, rectal bleeding, nausea and vomiting.    Past Medical History:   Diagnosis Date    Age-related osteoporosis without current pathological fracture 03/30/2022    Colon cancer (HCC)     Esophagus, Khanna's     Essential hypertension 11/28/2016    Fibromyalgia     GERD (gastroesophageal reflux disease)     HH (hiatus hernia) 07/25/2011    small    IBS (irritable bowel syndrome)     Insomnia     Memory disorder 11/28/2016    Osteoarthritis     TMJ (temporomandibular joint disorder)        Past Surgical History:   Procedure Laterality Date    CATARACT REMOVAL WITH IMPLANT      COLON SURGERY  04/10/2017

## 2025-02-20 ENCOUNTER — TELEPHONE (OUTPATIENT)
Dept: FAMILY MEDICINE CLINIC | Age: 80
End: 2025-02-20

## 2025-02-21 ENCOUNTER — PATIENT MESSAGE (OUTPATIENT)
Dept: FAMILY MEDICINE CLINIC | Age: 80
End: 2025-02-21

## 2025-02-21 DIAGNOSIS — Z86.39 H/O NON ANEMIC VITAMIN B12 DEFICIENCY: Primary | ICD-10-CM

## 2025-02-24 ENCOUNTER — HOSPITAL ENCOUNTER (OUTPATIENT)
Dept: GENERAL RADIOLOGY | Age: 80
Discharge: HOME | End: 2025-02-24
Attending: FAMILY MEDICINE
Payer: MEDICARE

## 2025-02-24 DIAGNOSIS — M81.0 AGE-RELATED OSTEOPOROSIS WITHOUT CURRENT PATHOLOGICAL FRACTURE: ICD-10-CM

## 2025-02-24 PROCEDURE — 77080 DXA BONE DENSITY AXIAL: CPT

## 2025-02-25 DIAGNOSIS — Z86.39 H/O NON ANEMIC VITAMIN B12 DEFICIENCY: ICD-10-CM

## 2025-02-26 LAB — VIT B12 SERPL-MCNC: 618 PG/ML (ref 211–911)

## 2025-02-28 ENCOUNTER — TELEPHONE (OUTPATIENT)
Dept: FAMILY MEDICINE CLINIC | Age: 80
End: 2025-02-28

## 2025-02-28 NOTE — TELEPHONE ENCOUNTER
Fabiola from [facility] [phone]    Is needing clarifications on the       Disp Refills Start End     denosumab (PROLIA) 60 MG/ML SOSY SC injection 180 mL 1 2/26/2025 2/26/2025    Sig - Route: Inject 1 mL into the skin once for 1 dose - SubCUTAneous        Please check the refills and instructions.

## 2025-03-04 NOTE — TELEPHONE ENCOUNTER
----- Message from Day ARECHIGA sent at 2/20/2025  9:58 AM EST -----  Regarding: ECC Referral Request  ECC Referral Request    Reason for referral request: Specialty Provider    Specialist/Lab/Test patient is requesting (if known): Neurologist    Specialist Phone Number (if applicable): Fax Number : [phone] Phone Number : [phone]    Additional Information : Patient need a referral for Dr. Carlee Amador and they need it to be directly sent to the fax number provider, PCP of the patient should be the one who send it.  --------------------------------------------------------------------------------------------------------------------------    Relationship to Patient: Self     Call Back Information: OK to leave message on voicemail  Preferred Call Back Number: Phone [phone]  
Can you please remove the pended item so I can close this? Thank you!  
Pended please review and advise   
Please submit referral for patient to information provided  
Referral already in chart  Please fax  
faxed  
No

## 2025-03-07 ENCOUNTER — HOSPITAL ENCOUNTER (OUTPATIENT)
Dept: ULTRASOUND IMAGING | Age: 80
Discharge: HOME | End: 2025-03-07
Payer: MEDICARE

## 2025-03-07 ENCOUNTER — HOSPITAL ENCOUNTER (OUTPATIENT)
Dept: WOMENS IMAGING | Age: 80
Discharge: HOME | End: 2025-03-07
Payer: MEDICARE

## 2025-03-07 DIAGNOSIS — R92.8 ABNORMAL FINDINGS ON DIAGNOSTIC IMAGING OF BREAST: ICD-10-CM

## 2025-03-07 PROCEDURE — 2709999900 US BREAST BIOPSY W LOC DEVICE 1ST LESION RIGHT

## 2025-03-07 PROCEDURE — 6360000002 HC RX W HCPCS: Performed by: FAMILY MEDICINE

## 2025-03-07 PROCEDURE — 77065 DX MAMMO INCL CAD UNI: CPT

## 2025-03-07 RX ORDER — LIDOCAINE HYDROCHLORIDE AND EPINEPHRINE 10; 10 MG/ML; UG/ML
20 INJECTION, SOLUTION INFILTRATION; PERINEURAL ONCE
Status: COMPLETED | OUTPATIENT
Start: 2025-03-07 | End: 2025-03-07

## 2025-03-07 RX ORDER — LIDOCAINE HYDROCHLORIDE 10 MG/ML
5 INJECTION, SOLUTION EPIDURAL; INFILTRATION; INTRACAUDAL; PERINEURAL ONCE
Status: COMPLETED | OUTPATIENT
Start: 2025-03-07 | End: 2025-03-07

## 2025-03-07 RX ADMIN — LIDOCAINE HYDROCHLORIDE 5 ML: 10 INJECTION, SOLUTION EPIDURAL; INFILTRATION; INTRACAUDAL; PERINEURAL at 13:13

## 2025-03-07 RX ADMIN — LIDOCAINE HYDROCHLORIDE,EPINEPHRINE BITARTRATE 10 ML: 10; .01 INJECTION, SOLUTION INFILTRATION; PERINEURAL at 13:13

## 2025-03-07 ASSESSMENT — PAIN DESCRIPTION - ORIENTATION: ORIENTATION: RIGHT

## 2025-03-07 ASSESSMENT — PAIN SCALES - GENERAL: PAINLEVEL_OUTOF10: 0

## 2025-03-07 ASSESSMENT — PAIN DESCRIPTION - LOCATION: LOCATION: BREAST

## 2025-03-07 ASSESSMENT — PAIN DESCRIPTION - DESCRIPTORS: DESCRIPTORS: BURNING;DISCOMFORT

## 2025-03-07 ASSESSMENT — PAIN - FUNCTIONAL ASSESSMENT: PAIN_FUNCTIONAL_ASSESSMENT: ACTIVITIES ARE NOT PREVENTED

## 2025-03-10 ENCOUNTER — TELEPHONE (OUTPATIENT)
Dept: WOMENS IMAGING | Age: 80
End: 2025-03-10

## 2025-03-10 ENCOUNTER — RESULTS FOLLOW-UP (OUTPATIENT)
Dept: ULTRASOUND IMAGING | Age: 80
End: 2025-03-10

## 2025-03-10 NOTE — TELEPHONE ENCOUNTER
Nurse Navigator reviewed breast biopsy results showing Right breast, 10:00, 8 cm from nipple, biopsy:      - Invasive mammary carcinoma of no special type (ductal, not otherwise        specified), Liam grade 2 (See Comment/ Checklist Summary).       Biomarkers:        Estrogen receptor: Positive (>95%, strong intensity)        Progesterone receptor: Negative (<1%; Internal control cells present     and stain as expected)        HER2 IHC: Negative (Score 1+)  on the pathology report. NN explained the terminology and reviewed the results for ER/NC and the additional HER2 test. We discussed several questions and process for establishing a care team and possible additional testing.     Patient offered [facility] and patient prefers Dr. Alves.    NN offered additional website reading if interested.  Given ACS, [email or web address], NCCN pt, ASCO and [email or web address].  Pt/family given NN phone number for further assistance.

## 2025-03-11 ENCOUNTER — TELEPHONE (OUTPATIENT)
Dept: SURGERY | Age: 80
End: 2025-03-11

## 2025-03-11 NOTE — TELEPHONE ENCOUNTER
Left a message to return my call. NP intake needs completed and scheduled for a cancer consult.     Andra, Tiffanie      Right breast, 10:00, 8 cm from nipple, biopsy:      - Invasive mammary carcinoma of no special type (ductal, not otherwise        specified), Troy grade 2 (See Comment/ Checklist Summary).       Biomarkers:        Estrogen receptor: Positive (>95%, strong intensity)        Progesterone receptor: Negative (<1%; Internal control cells present     and stain as expected)        HER2 IHC: Negative

## 2025-03-13 ENCOUNTER — INITIAL CONSULT (OUTPATIENT)
Dept: SURGERY | Age: 80
End: 2025-03-13

## 2025-03-13 VITALS
HEART RATE: 63 BPM | HEIGHT: 65 IN | RESPIRATION RATE: 16 BRPM | WEIGHT: 148 LBS | BODY MASS INDEX: 24.66 KG/M2 | SYSTOLIC BLOOD PRESSURE: 138 MMHG | DIASTOLIC BLOOD PRESSURE: 77 MMHG | OXYGEN SATURATION: 98 %

## 2025-03-13 DIAGNOSIS — C50.911 PRIMARY BREAST MALIGNANCY, RIGHT (HCC): Primary | ICD-10-CM

## 2025-03-13 RX ORDER — SODIUM CHLORIDE 9 MG/ML
INJECTION, SOLUTION INTRAVENOUS PRN
OUTPATIENT
Start: 2025-03-13

## 2025-03-13 RX ORDER — SODIUM CHLORIDE 0.9 % (FLUSH) 0.9 %
5-40 SYRINGE (ML) INJECTION EVERY 12 HOURS SCHEDULED
OUTPATIENT
Start: 2025-03-13

## 2025-03-13 RX ORDER — SODIUM CHLORIDE, SODIUM LACTATE, POTASSIUM CHLORIDE, CALCIUM CHLORIDE 600; 310; 30; 20 MG/100ML; MG/100ML; MG/100ML; MG/100ML
INJECTION, SOLUTION INTRAVENOUS CONTINUOUS
OUTPATIENT
Start: 2025-03-13

## 2025-03-13 RX ORDER — SODIUM CHLORIDE 0.9 % (FLUSH) 0.9 %
5-40 SYRINGE (ML) INJECTION PRN
OUTPATIENT
Start: 2025-03-13

## 2025-03-13 ASSESSMENT — ENCOUNTER SYMPTOMS
GASTROINTESTINAL NEGATIVE: 1
RESPIRATORY NEGATIVE: 1
EYES NEGATIVE: 1

## 2025-03-13 NOTE — PROGRESS NOTES
call the office with any further inquiries. There were 80 minutes of time were spent in preparation, direct patient contact, record review, imaging interpretation, care coordination, documentation and/or activities otherwise related to this encounter.

## 2025-03-14 ENCOUNTER — PREP FOR PROCEDURE (OUTPATIENT)
Dept: SURGERY | Age: 80
End: 2025-03-14

## 2025-03-14 DIAGNOSIS — C50.911 PRIMARY BREAST MALIGNANCY, RIGHT (HCC): Primary | ICD-10-CM

## 2025-03-14 DIAGNOSIS — C50.911 PRIMARY BREAST MALIGNANCY, RIGHT (HCC): ICD-10-CM

## 2025-03-24 ENCOUNTER — OFFICE VISIT (OUTPATIENT)
Dept: FAMILY MEDICINE CLINIC | Age: 80
End: 2025-03-24
Payer: MEDICARE

## 2025-03-24 VITALS
DIASTOLIC BLOOD PRESSURE: 76 MMHG | HEART RATE: 84 BPM | OXYGEN SATURATION: 98 % | HEIGHT: 65 IN | WEIGHT: 147 LBS | SYSTOLIC BLOOD PRESSURE: 124 MMHG | BODY MASS INDEX: 24.49 KG/M2

## 2025-03-24 DIAGNOSIS — Z85.038 HISTORY OF COLON CANCER: ICD-10-CM

## 2025-03-24 DIAGNOSIS — C50.911 PRIMARY BREAST MALIGNANCY, RIGHT: ICD-10-CM

## 2025-03-24 DIAGNOSIS — I10 ESSENTIAL HYPERTENSION: ICD-10-CM

## 2025-03-24 DIAGNOSIS — Z01.818 PRE-OP EXAM: Primary | ICD-10-CM

## 2025-03-24 DIAGNOSIS — R41.3 MEMORY DISORDER: ICD-10-CM

## 2025-03-24 DIAGNOSIS — Z01.818 PRE-OP EXAM: ICD-10-CM

## 2025-03-24 DIAGNOSIS — G47.33 OSA (OBSTRUCTIVE SLEEP APNEA): ICD-10-CM

## 2025-03-24 PROCEDURE — 99214 OFFICE O/P EST MOD 30 MIN: CPT | Performed by: FAMILY MEDICINE

## 2025-03-24 PROCEDURE — 3078F DIAST BP <80 MM HG: CPT | Performed by: FAMILY MEDICINE

## 2025-03-24 PROCEDURE — G2211 COMPLEX E/M VISIT ADD ON: HCPCS | Performed by: FAMILY MEDICINE

## 2025-03-24 PROCEDURE — 93000 ELECTROCARDIOGRAM COMPLETE: CPT | Performed by: FAMILY MEDICINE

## 2025-03-24 PROCEDURE — 3074F SYST BP LT 130 MM HG: CPT | Performed by: FAMILY MEDICINE

## 2025-03-24 PROCEDURE — 1159F MED LIST DOCD IN RCRD: CPT | Performed by: FAMILY MEDICINE

## 2025-03-24 PROCEDURE — 1123F ACP DISCUSS/DSCN MKR DOCD: CPT | Performed by: FAMILY MEDICINE

## 2025-03-24 NOTE — H&P (VIEW-ONLY)
[facility] Medicine Preoperative Evaluation       Zonia Jaramillo MD                                       [address]     [address]     [place], [place] [id number] [phone] Phone     [phone] Fax    Dear Dr. Alves,     Thank you for referring Malorie Lomeli to me for Preoperative Evaluation. Below are the relevant portions of my assessment and plan of care.    Malorie Lomeli    79 y.o.   1945    [address]  [facility] [address]    Vitals:    03/24/25 1111   BP: 124/76   Pulse: 84   SpO2: 98%   Weight: 66.7 kg (147 lb)   Height: 1.651 m (5' 5\")      Wt Readings from Last 2 Encounters:   03/24/25 66.7 kg (147 lb)   03/13/25 67.1 kg (148 lb)     BP Readings from Last 3 Encounters:   03/24/25 124/76   03/13/25 138/77   02/19/25 122/74        No Known Allergies  Outpatient Medications Marked as Taking for the 3/24/25 encounter (Office Visit) with Zonia Jaramillo MD   Medication Sig Dispense Refill    lisinopril (PRINIVIL;ZESTRIL) 30 MG tablet Take 1 tablet by mouth daily 90 tablet 1    valACYclovir (VALTREX) 500 MG tablet TAKE 2 TABLETS NOW, THEN TAKE 2 TABLETS IN 12 HOURS 12 tablet 5    memantine (NAMENDA) 10 MG tablet Take 1 tablet by mouth 2 times daily (Patient taking differently: Take 1 tablet by mouth daily) 180 tablet 1    pantoprazole (PROTONIX) 40 MG tablet TAKE 1 TABLET EVERY MORNINGBEFORE BREAKFAST 90 tablet 1    DULoxetine (CYMBALTA) 30 MG extended release capsule TAKE 1 CAPSULE DAILY 90 capsule 3    Pancrelipase, Lip-Prot-Amyl, (ZENPEP) 98563-831720 units CPEP Take by mouth as needed      ammonium lactate (AMLACTIN) 12 % cream APPLY TO THE AFFECTED AREA(S) DAILY AS NEEDED 140 g 0    Cholecalciferol (VITAMIN D) 2000 UNITS CAPS capsule Take 1 capsule by mouth daily         She presents to the office today for a preoperative consultation at the request of their surgeon, Dr. Alves who plans on performing Partial mastectomy on right and lymph node  Surgery >4 METs Yes functional capacity is classified as excellent (>10 METs), good (7 METs to 10 METs), moderate (4 METs to 6 METs), poor (<4 METs). Examples of activities associated with >4 METs are climbing a flight of stairs or walking up a hill, walking on level ground at 4 mph, and performing heavy work around the house.    According to the 2014 ACC/AHA pre-operative risk assessment guidelines this patient is a low risk for major cardiac complications during a low risk procedure and may continue as planned.       If you have questions, please do not hesitate to call me.    Sincerely,        Zonia Jaramillo MD

## 2025-03-24 NOTE — PROGRESS NOTES
education level: Not on file   Occupational History    Not on file   Tobacco Use    Smoking status: Former     Current packs/day: 0.00     Average packs/day: 0.5 packs/day for 15.0 years (7.5 ttl pk-yrs)     Types: Cigarettes     Start date: 1987     Quit date: 2002     Years since quittin.5     Passive exposure: Past    Smokeless tobacco: Never   Vaping Use    Vaping status: Never Used   Substance and Sexual Activity    Alcohol use: Yes     Alcohol/week: 6.0 standard drinks of alcohol     Types: 6 Glasses of wine per week     Comment: I have one glass when fixing dinner, and 2 while eating    Drug use: No    Sexual activity: Not Currently     Partners: Male   Other Topics Concern    Not on file   Social History Narrative    Not on file     Social Drivers of Health     Financial Resource Strain: Low Risk  (2024)    Overall Financial Resource Strain (CARDIA)     Difficulty of Paying Living Expenses: Not hard at all   Food Insecurity: No Food Insecurity (2025)    Hunger Vital Sign     Worried About Running Out of Food in the Last Year: Never true     Ran Out of Food in the Last Year: Never true   Transportation Needs: No Transportation Needs (2025)    PRAPARE - Transportation     Lack of Transportation (Medical): No     Lack of Transportation (Non-Medical): No   Physical Activity: Insufficiently Active (2024)    Exercise Vital Sign     Days of Exercise per Week: 2 days     Minutes of Exercise per Session: 30 min   Stress: Not on file   Social Connections: Not on file   Intimate Partner Violence: Not on file   Housing Stability: Low Risk  (2025)    Housing Stability Vital Sign     Unable to Pay for Housing in the Last Year: No     Number of Times Moved in the Last Year: 0     Homeless in the Last Year: No     Family History   Problem Relation Age of Onset    Colon Cancer Mother             Arthritis Mother     Other Father             Hypertension Father     Cancer

## 2025-03-25 ENCOUNTER — HOSPITAL ENCOUNTER (OUTPATIENT)
Dept: WOMENS IMAGING | Age: 80
Discharge: HOME | End: 2025-03-25
Payer: MEDICARE

## 2025-03-25 ENCOUNTER — HOSPITAL ENCOUNTER (OUTPATIENT)
Dept: ULTRASOUND IMAGING | Age: 80
Discharge: HOME | End: 2025-03-25
Payer: MEDICARE

## 2025-03-25 DIAGNOSIS — C50.911 PRIMARY BREAST MALIGNANCY, RIGHT: ICD-10-CM

## 2025-03-25 LAB
ANION GAP SERPL CALCULATED.3IONS-SCNC: 11 MMOL/L (ref 3–16)
BASOPHILS # BLD: 0 K/UL (ref 0–0.2)
BASOPHILS NFR BLD: 0.6 %
BUN SERPL-MCNC: 17 MG/DL (ref 7–20)
CALCIUM SERPL-MCNC: 9.8 MG/DL (ref 8.3–10.6)
CHLORIDE SERPL-SCNC: 102 MMOL/L (ref 99–110)
CO2 SERPL-SCNC: 26 MMOL/L (ref 21–32)
CREAT SERPL-MCNC: 0.8 MG/DL (ref 0.6–1.2)
DEPRECATED RDW RBC AUTO: 15.9 % (ref 12.4–15.4)
EOSINOPHIL # BLD: 0.2 K/UL (ref 0–0.6)
EOSINOPHIL NFR BLD: 2.4 %
GFR SERPLBLD CREATININE-BSD FMLA CKD-EPI: 76 ML/MIN/{1.73_M2}
GLUCOSE SERPL-MCNC: 93 MG/DL (ref 70–99)
HCT VFR BLD AUTO: 37.3 % (ref 36–48)
HGB BLD-MCNC: 11.7 G/DL (ref 12–16)
LYMPHOCYTES # BLD: 1.7 K/UL (ref 1–5.1)
LYMPHOCYTES NFR BLD: 27.4 %
MCH RBC QN AUTO: 25.2 PG (ref 26–34)
MCHC RBC AUTO-ENTMCNC: 31.3 G/DL (ref 31–36)
MCV RBC AUTO: 80.6 FL (ref 80–100)
MONOCYTES # BLD: 0.6 K/UL (ref 0–1.3)
MONOCYTES NFR BLD: 9.4 %
NEUTROPHILS # BLD: 3.8 K/UL (ref 1.7–7.7)
NEUTROPHILS NFR BLD: 60.2 %
PLATELET # BLD AUTO: 306 K/UL (ref 135–450)
PMV BLD AUTO: 9 FL (ref 5–10.5)
POTASSIUM SERPL-SCNC: 4.6 MMOL/L (ref 3.5–5.1)
RBC # BLD AUTO: 4.63 M/UL (ref 4–5.2)
SODIUM SERPL-SCNC: 139 MMOL/L (ref 136–145)
WBC # BLD AUTO: 6.3 K/UL (ref 4–11)

## 2025-03-25 PROCEDURE — 6360000002 HC RX W HCPCS: Performed by: SURGERY

## 2025-03-25 PROCEDURE — 2709999900 US PLACE BREAST LOC DEVICE 1ST LESION RIGHT

## 2025-03-25 PROCEDURE — 77065 DX MAMMO INCL CAD UNI: CPT

## 2025-03-25 RX ORDER — LIDOCAINE HYDROCHLORIDE 10 MG/ML
5 INJECTION, SOLUTION EPIDURAL; INFILTRATION; INTRACAUDAL; PERINEURAL ONCE
Status: COMPLETED | OUTPATIENT
Start: 2025-03-25 | End: 2025-03-25

## 2025-03-25 RX ADMIN — LIDOCAINE HYDROCHLORIDE 5 ML: 10 INJECTION, SOLUTION EPIDURAL; INFILTRATION; INTRACAUDAL; PERINEURAL at 14:31

## 2025-03-25 NOTE — PROGRESS NOTES
Nurse Navigator reviewed day of breast needle localization RFID tag placement.      Patient here for breast needle localization, using RFID tag placement.  Navigator reviewed the health history, allergies and medications. Family member                   Or drove herself.  Radiologist reviews procedure with patient, consent signed. Patient tolerates procedure well. Compression held. Site cleansed with chloraprep, steri strips and dry dressing applied. Ice pack in place. Reviewed discharge instructions with patient and signed copy. Patient verbalized understanding and agreed to contact Navigator with any questions. Patient A&Ox3, steady on feet and discharged home.

## 2025-03-26 ENCOUNTER — RESULTS FOLLOW-UP (OUTPATIENT)
Dept: FAMILY MEDICINE CLINIC | Age: 80
End: 2025-03-26

## 2025-04-02 ENCOUNTER — ANESTHESIA EVENT (OUTPATIENT)
Dept: OPERATING ROOM | Age: 80
End: 2025-04-02
Payer: MEDICARE

## 2025-04-02 ENCOUNTER — ANESTHESIA (OUTPATIENT)
Dept: OPERATING ROOM | Age: 80
End: 2025-04-02
Payer: MEDICARE

## 2025-04-02 ENCOUNTER — HOSPITAL ENCOUNTER (OUTPATIENT)
Age: 80
Setting detail: OUTPATIENT SURGERY
Discharge: HOME | End: 2025-04-02
Attending: SURGERY | Admitting: SURGERY
Payer: MEDICARE

## 2025-04-02 ENCOUNTER — HOSPITAL ENCOUNTER (OUTPATIENT)
Dept: NUCLEAR MEDICINE | Age: 80
Discharge: HOME | End: 2025-04-02
Attending: SURGERY
Payer: MEDICARE

## 2025-04-02 ENCOUNTER — HOSPITAL ENCOUNTER (OUTPATIENT)
Dept: WOMENS IMAGING | Age: 80
Discharge: HOME | End: 2025-04-02
Payer: MEDICARE

## 2025-04-02 VITALS
HEIGHT: 65 IN | TEMPERATURE: 97.1 F | RESPIRATION RATE: 14 BRPM | SYSTOLIC BLOOD PRESSURE: 138 MMHG | OXYGEN SATURATION: 98 % | BODY MASS INDEX: 24.18 KG/M2 | HEART RATE: 50 BPM | WEIGHT: 145.1 LBS | DIASTOLIC BLOOD PRESSURE: 73 MMHG

## 2025-04-02 DIAGNOSIS — C50.911 PRIMARY BREAST MALIGNANCY, RIGHT: ICD-10-CM

## 2025-04-02 DIAGNOSIS — G89.18 POST-OP PAIN: Primary | ICD-10-CM

## 2025-04-02 DIAGNOSIS — R92.8 ABNORMAL MAMMOGRAM: ICD-10-CM

## 2025-04-02 LAB
ABO/RH: NORMAL
ANTIBODY SCREEN: NORMAL

## 2025-04-02 PROCEDURE — 2500000003 HC RX 250 WO HCPCS: Performed by: NURSE ANESTHETIST, CERTIFIED REGISTERED

## 2025-04-02 PROCEDURE — 38525 BIOPSY/REMOVAL LYMPH NODES: CPT | Performed by: SURGERY

## 2025-04-02 PROCEDURE — 88307 TISSUE EXAM BY PATHOLOGIST: CPT

## 2025-04-02 PROCEDURE — 6360000002 HC RX W HCPCS: Performed by: SURGERY

## 2025-04-02 PROCEDURE — 7100000000 HC PACU RECOVERY - FIRST 15 MIN: Performed by: SURGERY

## 2025-04-02 PROCEDURE — 7100000001 HC PACU RECOVERY - ADDTL 15 MIN: Performed by: SURGERY

## 2025-04-02 PROCEDURE — 2500000003 HC RX 250 WO HCPCS: Performed by: SURGERY

## 2025-04-02 PROCEDURE — 19301 PARTIAL MASTECTOMY: CPT | Performed by: SURGERY

## 2025-04-02 PROCEDURE — 3700000001 HC ADD 15 MINUTES (ANESTHESIA): Performed by: SURGERY

## 2025-04-02 PROCEDURE — 86900 BLOOD TYPING SEROLOGIC ABO: CPT

## 2025-04-02 PROCEDURE — 86901 BLOOD TYPING SEROLOGIC RH(D): CPT

## 2025-04-02 PROCEDURE — 88342 IMHCHEM/IMCYTCHM 1ST ANTB: CPT

## 2025-04-02 PROCEDURE — 86850 RBC ANTIBODY SCREEN: CPT

## 2025-04-02 PROCEDURE — 2709999900 HC NON-CHARGEABLE SUPPLY: Performed by: SURGERY

## 2025-04-02 PROCEDURE — 6360000002 HC RX W HCPCS: Performed by: NURSE ANESTHETIST, CERTIFIED REGISTERED

## 2025-04-02 PROCEDURE — 3430000000 HC RX DIAGNOSTIC RADIOPHARMACEUTICAL: Performed by: SURGERY

## 2025-04-02 PROCEDURE — 76098 X-RAY EXAM SURGICAL SPECIMEN: CPT

## 2025-04-02 PROCEDURE — 38792 RA TRACER ID OF SENTINL NODE: CPT

## 2025-04-02 PROCEDURE — 6370000000 HC RX 637 (ALT 250 FOR IP): Performed by: ANESTHESIOLOGY

## 2025-04-02 PROCEDURE — 88305 TISSUE EXAM BY PATHOLOGIST: CPT

## 2025-04-02 PROCEDURE — 3600000014 HC SURGERY LEVEL 4 ADDTL 15MIN: Performed by: SURGERY

## 2025-04-02 PROCEDURE — 2580000003 HC RX 258: Performed by: SURGERY

## 2025-04-02 PROCEDURE — 7100000010 HC PHASE II RECOVERY - FIRST 15 MIN: Performed by: SURGERY

## 2025-04-02 PROCEDURE — 2720000010 HC SURG SUPPLY STERILE: Performed by: SURGERY

## 2025-04-02 PROCEDURE — 3600000004 HC SURGERY LEVEL 4 BASE: Performed by: SURGERY

## 2025-04-02 PROCEDURE — 7100000011 HC PHASE II RECOVERY - ADDTL 15 MIN: Performed by: SURGERY

## 2025-04-02 PROCEDURE — 14001 TIS TRNFR TRUNK 10.1-30SQCM: CPT | Performed by: SURGERY

## 2025-04-02 PROCEDURE — A9520 TC99 TILMANOCEPT DIAG 0.5MCI: HCPCS | Performed by: SURGERY

## 2025-04-02 PROCEDURE — 38900 IO MAP OF SENT LYMPH NODE: CPT | Performed by: SURGERY

## 2025-04-02 PROCEDURE — 3700000000 HC ANESTHESIA ATTENDED CARE: Performed by: SURGERY

## 2025-04-02 PROCEDURE — 38792 RA TRACER ID OF SENTINL NODE: CPT | Performed by: SURGERY

## 2025-04-02 PROCEDURE — 6360000002 HC RX W HCPCS: Performed by: ANESTHESIOLOGY

## 2025-04-02 RX ORDER — PROCHLORPERAZINE EDISYLATE 5 MG/ML
5 INJECTION INTRAMUSCULAR; INTRAVENOUS
Status: DISCONTINUED | OUTPATIENT
Start: 2025-04-02 | End: 2025-04-02 | Stop reason: HOSPADM

## 2025-04-02 RX ORDER — HALOPERIDOL 5 MG/ML
1 INJECTION INTRAMUSCULAR
Status: DISCONTINUED | OUTPATIENT
Start: 2025-04-02 | End: 2025-04-02 | Stop reason: HOSPADM

## 2025-04-02 RX ORDER — EPHEDRINE SULFATE 50 MG/ML
INJECTION INTRAVENOUS
Status: DISCONTINUED | OUTPATIENT
Start: 2025-04-02 | End: 2025-04-02 | Stop reason: SDUPTHER

## 2025-04-02 RX ORDER — SODIUM CHLORIDE, SODIUM LACTATE, POTASSIUM CHLORIDE, CALCIUM CHLORIDE 600; 310; 30; 20 MG/100ML; MG/100ML; MG/100ML; MG/100ML
INJECTION, SOLUTION INTRAVENOUS CONTINUOUS
Status: DISCONTINUED | OUTPATIENT
Start: 2025-04-02 | End: 2025-04-02 | Stop reason: HOSPADM

## 2025-04-02 RX ORDER — SODIUM CHLORIDE 9 MG/ML
INJECTION, SOLUTION INTRAVENOUS PRN
Status: DISCONTINUED | OUTPATIENT
Start: 2025-04-02 | End: 2025-04-02 | Stop reason: HOSPADM

## 2025-04-02 RX ORDER — APREPITANT 40 MG/1
40 CAPSULE ORAL ONCE
Status: COMPLETED | OUTPATIENT
Start: 2025-04-02 | End: 2025-04-02

## 2025-04-02 RX ORDER — PROPOFOL 10 MG/ML
INJECTION, EMULSION INTRAVENOUS
Status: DISCONTINUED | OUTPATIENT
Start: 2025-04-02 | End: 2025-04-02 | Stop reason: SDUPTHER

## 2025-04-02 RX ORDER — FENTANYL CITRATE 50 UG/ML
25 INJECTION, SOLUTION INTRAMUSCULAR; INTRAVENOUS EVERY 5 MIN PRN
Status: DISCONTINUED | OUTPATIENT
Start: 2025-04-02 | End: 2025-04-02 | Stop reason: HOSPADM

## 2025-04-02 RX ORDER — SODIUM CHLORIDE 0.9 % (FLUSH) 0.9 %
5-40 SYRINGE (ML) INJECTION PRN
Status: DISCONTINUED | OUTPATIENT
Start: 2025-04-02 | End: 2025-04-02 | Stop reason: HOSPADM

## 2025-04-02 RX ORDER — ACETAMINOPHEN 500 MG
1000 TABLET ORAL ONCE
Status: COMPLETED | OUTPATIENT
Start: 2025-04-02 | End: 2025-04-02

## 2025-04-02 RX ORDER — NALOXONE HYDROCHLORIDE 0.4 MG/ML
INJECTION, SOLUTION INTRAMUSCULAR; INTRAVENOUS; SUBCUTANEOUS PRN
Status: DISCONTINUED | OUTPATIENT
Start: 2025-04-02 | End: 2025-04-02 | Stop reason: HOSPADM

## 2025-04-02 RX ORDER — SODIUM CHLORIDE 0.9 % (FLUSH) 0.9 %
5-40 SYRINGE (ML) INJECTION EVERY 12 HOURS SCHEDULED
Status: DISCONTINUED | OUTPATIENT
Start: 2025-04-02 | End: 2025-04-02 | Stop reason: HOSPADM

## 2025-04-02 RX ORDER — HYDROMORPHONE HYDROCHLORIDE 2 MG/ML
0.5 INJECTION, SOLUTION INTRAMUSCULAR; INTRAVENOUS; SUBCUTANEOUS EVERY 5 MIN PRN
Status: DISCONTINUED | OUTPATIENT
Start: 2025-04-02 | End: 2025-04-02 | Stop reason: HOSPADM

## 2025-04-02 RX ORDER — FENTANYL CITRATE 50 UG/ML
INJECTION, SOLUTION INTRAMUSCULAR; INTRAVENOUS
Status: DISCONTINUED | OUTPATIENT
Start: 2025-04-02 | End: 2025-04-02 | Stop reason: SDUPTHER

## 2025-04-02 RX ORDER — HYDRALAZINE HYDROCHLORIDE 20 MG/ML
10 INJECTION INTRAMUSCULAR; INTRAVENOUS
Status: DISCONTINUED | OUTPATIENT
Start: 2025-04-02 | End: 2025-04-02 | Stop reason: HOSPADM

## 2025-04-02 RX ORDER — HYDROCODONE BITARTRATE AND ACETAMINOPHEN 5; 325 MG/1; MG/1
1 TABLET ORAL
Qty: 35 TABLET | Refills: 0 | Status: SHIPPED | OUTPATIENT
Start: 2025-04-02 | End: 2025-04-09

## 2025-04-02 RX ORDER — ISOSULFAN BLUE 50 MG/5ML
INJECTION, SOLUTION SUBCUTANEOUS
Status: DISCONTINUED | OUTPATIENT
Start: 2025-04-02 | End: 2025-04-02 | Stop reason: ALTCHOICE

## 2025-04-02 RX ORDER — ONDANSETRON 2 MG/ML
INJECTION INTRAMUSCULAR; INTRAVENOUS
Status: DISCONTINUED | OUTPATIENT
Start: 2025-04-02 | End: 2025-04-02 | Stop reason: SDUPTHER

## 2025-04-02 RX ORDER — GLYCOPYRROLATE 0.2 MG/ML
INJECTION INTRAMUSCULAR; INTRAVENOUS
Status: DISCONTINUED | OUTPATIENT
Start: 2025-04-02 | End: 2025-04-02 | Stop reason: SDUPTHER

## 2025-04-02 RX ORDER — LIDOCAINE HYDROCHLORIDE 20 MG/ML
INJECTION, SOLUTION INFILTRATION; PERINEURAL
Status: DISCONTINUED | OUTPATIENT
Start: 2025-04-02 | End: 2025-04-02 | Stop reason: SDUPTHER

## 2025-04-02 RX ORDER — DEXAMETHASONE SODIUM PHOSPHATE 4 MG/ML
INJECTION, SOLUTION INTRA-ARTICULAR; INTRALESIONAL; INTRAMUSCULAR; INTRAVENOUS; SOFT TISSUE
Status: DISCONTINUED | OUTPATIENT
Start: 2025-04-02 | End: 2025-04-02 | Stop reason: SDUPTHER

## 2025-04-02 RX ORDER — BUPIVACAINE HYDROCHLORIDE AND EPINEPHRINE 2.5; 5 MG/ML; UG/ML
INJECTION, SOLUTION EPIDURAL; INFILTRATION; INTRACAUDAL; PERINEURAL
Status: DISCONTINUED | OUTPATIENT
Start: 2025-04-02 | End: 2025-04-02 | Stop reason: ALTCHOICE

## 2025-04-02 RX ADMIN — ONDANSETRON 4 MG: 2 INJECTION INTRAMUSCULAR; INTRAVENOUS at 10:41

## 2025-04-02 RX ADMIN — LIDOCAINE HYDROCHLORIDE 50 MG: 20 INJECTION, SOLUTION INFILTRATION; PERINEURAL at 10:39

## 2025-04-02 RX ADMIN — ACETAMINOPHEN 1000 MG: 500 TABLET ORAL at 09:29

## 2025-04-02 RX ADMIN — APREPITANT 40 MG: 40 CAPSULE ORAL at 09:29

## 2025-04-02 RX ADMIN — SODIUM CHLORIDE, POTASSIUM CHLORIDE, SODIUM LACTATE AND CALCIUM CHLORIDE: 600; 310; 30; 20 INJECTION, SOLUTION INTRAVENOUS at 09:30

## 2025-04-02 RX ADMIN — PROPOFOL 120 MG: 10 INJECTION, EMULSION INTRAVENOUS at 10:39

## 2025-04-02 RX ADMIN — GLYCOPYRROLATE 0.2 MG: 0.2 INJECTION INTRAMUSCULAR; INTRAVENOUS at 10:43

## 2025-04-02 RX ADMIN — EPHEDRINE SULFATE 10 MG: 50 INJECTION, SOLUTION INTRAVENOUS at 11:09

## 2025-04-02 RX ADMIN — CEFAZOLIN 2000 MG: 2 INJECTION, POWDER, FOR SOLUTION INTRAMUSCULAR; INTRAVENOUS at 10:44

## 2025-04-02 RX ADMIN — FENTANYL CITRATE 50 MCG: 50 INJECTION, SOLUTION INTRAMUSCULAR; INTRAVENOUS at 10:49

## 2025-04-02 RX ADMIN — TILMANOCEPT 521 MICRO CURIE: KIT at 14:01

## 2025-04-02 RX ADMIN — FENTANYL CITRATE 50 MCG: 50 INJECTION, SOLUTION INTRAMUSCULAR; INTRAVENOUS at 10:34

## 2025-04-02 RX ADMIN — DEXAMETHASONE SODIUM PHOSPHATE 4 MG: 4 INJECTION, SOLUTION INTRAMUSCULAR; INTRAVENOUS at 10:41

## 2025-04-02 ASSESSMENT — PAIN - FUNCTIONAL ASSESSMENT
PAIN_FUNCTIONAL_ASSESSMENT: NONE - DENIES PAIN
PAIN_FUNCTIONAL_ASSESSMENT: NONE - DENIES PAIN
PAIN_FUNCTIONAL_ASSESSMENT: 0-10
PAIN_FUNCTIONAL_ASSESSMENT: NONE - DENIES PAIN
PAIN_FUNCTIONAL_ASSESSMENT: NONE - DENIES PAIN

## 2025-04-02 ASSESSMENT — PAIN SCALES - GENERAL: PAINLEVEL_OUTOF10: 0

## 2025-04-02 NOTE — ANESTHESIA PRE PROCEDURE
Department of Anesthesiology  Preprocedure Note       Name:  Malorie Lomeli   Age:  79 y.o.  :  1945                                          MRN:  [id number]         Date:  2025      Surgeon: Surgeon(s):  Sierra Alves MD    Procedure: Procedure(s):  LOCALIZED RIGHT BREAST PARTIAL MASTECTOMY, RIGHT SENTINEL LYMPH NODE BIOPSY. TECHNETIUM NINETY- NINE AND INJECTABLE BLUE DYE IN THE OPERATING ROOM    Medications prior to admission:   Prior to Admission medications    Medication Sig Start Date End Date Taking? Authorizing Provider   denosumab (PROLIA) 60 MG/ML SOSY SC injection Inject 1 mL into the skin once for 1 dose 3/3/25 3/3/25  Zonia Jaramillo MD   lisinopril (PRINIVIL;ZESTRIL) 30 MG tablet Take 1 tablet by mouth daily 25   Zonia Jaramillo MD   valACYclovir (VALTREX) 500 MG tablet TAKE 2 TABLETS NOW, THEN TAKE 2 TABLETS IN 12 HOURS 25   Zonia Jaramillo MD   memantine (NAMENDA) 10 MG tablet Take 1 tablet by mouth 2 times daily  Patient taking differently: Take 1 tablet by mouth daily 24   Noe Aguirre MD   zolpidem (AMBIEN CR) 6.25 MG extended release tablet Take 1 tablet by mouth nightly as needed for Sleep for up to 90 days. Max Daily Amount: 6.25 mg 12/10/24 3/10/25  Noe Aguirre MD   pantoprazole (PROTONIX) 40 MG tablet TAKE 1 TABLET EVERY MORNINGBEFORE BREAKFAST 24   Noe Aguirre MD   DULoxetine (CYMBALTA) 30 MG extended release capsule TAKE 1 CAPSULE DAILY 24   Noe Aguirre MD   Pancrelipase, Lip-Prot-Amyl, (ZENPEP) 15323-730191 units CPEP Take by mouth as needed    ProviderFrank MD   ammonium lactate (AMLACTIN) 12 % cream APPLY TO THE AFFECTED AREA(S) DAILY AS NEEDED 23   Noe Aguirre MD   Cholecalciferol (VITAMIN D) 2000 UNITS CAPS capsule Take 1 capsule by mouth daily    ProviderFrank MD       Current medications:    Current Facility-Administered Medications   Medication Dose Route Frequency Provider Last Rate

## 2025-04-02 NOTE — OP NOTE
is in good position.     She was brought to the operating room and placed supine with her arms extended on boards. She was appropriately positioned and padded. Compression stockings were placed.  Appropriate antibiotics were administered within 60 minutes of the incision. Breast imaging was available in the room.  After induction of general anesthesia, the appropriate World Health Organization timeout procedure was performed. At 1048 0.5 millicuries of Lymphoseek technetium-99 sulfur colloid was injected intradermally in a periareolar location at 12:00, 3:00, 6:00, and 9:00.  A neoprobe was then used to identify a hot spot. The location was marked.  This initial count was 124. After this 5 mL of  isosulfuran blue dye for lymphatic mapping was injected intraparenchymally at 1049.  A five minute massage was performed.    The right breast and axillary region, upper arm, and chest wall were prepped and draped in the normal sterile fashion. Attention was then turned to the breast for the partial mastectomy. There was one localizer placed by the mammographer marking the right breast cancer.  The skin and soft tissues over the proposed incision were infiltrated with local. A curvilinear incision was made at the 10:00 location. Flaps were raised and dissection was carried out in a rectangular fashion around the localizer and target. No skin was removed. Dissection was carried to the chest wall. Dissection was carried out carefully to try and maintain the localizer centrally within the specimen. The specimen was excised and gross examination revealed adequate margins.  The specimen was oriented with a margin map.  It was submitted for specimen radiography which revealed the lesion in question with closest cranial, lateral and anterior but otherwise adequate radiographic margins. This was reviewed with onsite radiology. An additional margin was obtained cranial, lateral and anterior with stitches on the new margins. The wound  layers using a 3-0 vicryl stitch followed by a 3-0 and 4-0 monocryl dermal and subcuticular approximation with overlying skin glue. The instrument, sponge, and needle counts were correct.      Points noted above:  Operation performed with curative intent: Yes  Tracer(s) used to identify sentinel nodes in the upfront surgery (nonneoadjuvant) setting (select all that apply) : Dye and Radioactive tracer  Tracer(s) used to identify sentinel nodes in the neoadjuvant setting (select all that apply): Not Applicable  All nodes (colored or noncolored) present at the end of a dye-filled lymphatic channel were removed: Yes  All significantly radioactive nodes were removed: Yes  All palpably suspicious nodes were removed: Yes  Biopsy-proven positive nodes marked with clips prior to chemotherapy were identified and removed: Not Applicable        Ms. Lomeli was awakened and placed into a surgical bra.  She was taken to the recovery room in stable condition. Her family was notified of intraoperative findings.      Electronically signed by Sierra Alves MD on 4/2/25 at 10:01 AM SUSAN

## 2025-04-02 NOTE — INTERVAL H&P NOTE
H&P Update    The patient's most recent H&P, office notes, breast imaging, and pathology were reviewed.    Patient examined and laterality marked.    There has been no changes.  We will plan to proceed with a right partial mastectomy with sentinel lymph node biopsy.    Sierra Alves MD

## 2025-04-02 NOTE — ANESTHESIA POSTPROCEDURE EVALUATION
Department of Anesthesiology  Postprocedure Note    Patient: Malorie Lomeli  MRN: [id number]  YOB: 1945  Date of evaluation: 4/2/2025    Procedure Summary       Date: 04/02/25 Room / Location: [address]    Anesthesia Start: 1034 Anesthesia Stop: 1221    Procedure: LOCALIZED RIGHT BREAST PARTIAL MASTECTOMY, RIGHT SENTINEL LYMPH NODE BIOPSY. TECHNETIUM NINETY- NINE AND INJECTABLE BLUE DYE IN THE OPERATING ROOM (Right: Breast) Diagnosis:       Primary breast malignancy, right      (Primary breast malignancy, right (HCC) [C50.911])    Surgeons: Sierra Alves MD Responsible Provider: Luis Ochoa MD    Anesthesia Type: general ASA Status: 3            Anesthesia Type: No value filed.    Maribell Phase I: Maribell Score: 6    Maribell Phase II:      Anesthesia Post Evaluation    Patient location during evaluation: PACU  Patient participation: complete - patient participated  Level of consciousness: awake and alert  Pain score: 2  Airway patency: patent  Nausea & Vomiting: no vomiting  Cardiovascular status: blood pressure returned to baseline  Respiratory status: acceptable  Hydration status: euvolemic  Multimodal analgesia pain management approach  Pain management: adequate    No notable events documented.

## 2025-04-02 NOTE — DISCHARGE INSTRUCTIONS
Postoperative Instructions for Breast Surgery  Sierra Alves MD [address], [address]  [place] [address]  ([phone])    These are general guidelines to help assist in recovery after breast surgery. Please keep in mind all patients recover differently, so please call the office with any questions ([phone]).    General guidelines   Rest when you feel tired  You will have a surgical bra on when you wake up. Please wear this day and night until your postoperative appointment. It can be removed for laundering and for showers. This helps immobilize the breast to alleviate discomfort as well as maintain pressure to avoid postoperative seroma.  If you had a sentinel lymph node procedure, it is common to have an interval of blue urine and/or stool and blue skin changes of the breast.   Final pathology will take 3-5 days to result. The breast surgery office will call you with the results when they are known.    Pain management  You may feel mild to moderate discomfort when anesthesia wears off  You will be given a prescription for a narcotic pain medication  Some patients experience very little discomfort and they prefer not to use the narcotic  You may take Tylenol or extra strength Tylenol instead of the narcotic  Many narcotics also contained Tylenol so you should not take both  AVOID aspirin, fish oil, Excedrin, Motrin, ibuprofen, and other NSAIDS immediately after surgery for at least 48-72 hours.  Anticoagulation such as warfarin can typically resume 48 hours after surgery.  This should be discussed with your surgeon and prescribing physician.  Narcotic medication may cause constipation. Make sure to keep well hydrated, ambulate, and you may eat high-fiber foods to help avoid constipation. You may use over-the-counter medications for constipation.  You should not consume alcohol while taking narcotics  You should not drive while taking narcotics  Pain should improve, not worsen as days pass. If  recovers at different paces. You may be able to return to work in the next one to several weeks. Most people return to work in 2-4 weeks, however, this depends on your type of work and overall health.  For patients undergoing mastectomy and/or axillary dissection, you may begin arm exercises that after surgery. It is important to start slowly and gradually increase your activity. You may be referred to physical therapy for additional exercises.  If you have a drain in place, be cautious of this and only perform light activity.  If you had reconstructive surgery, please discuss activity limitations with your plastic surgeon    Diet  You may encounter nausea following surgery. Drink clear liquids or popsicles until you are feeling better.  You have no diet restrictions and may resume a regular healthy diet immediately.  You may add fiber or any over the counter stool softener/laxative to your diet to help with constipation incurred by narcotic pain medications.    You can resume all of your regular medications immediately. He should discuss with her physician one to resume blood thinners.    Follow-up  A follow-up appointment has been made for you. If you don't know at this appointment date is please call the office at ([phone]).    When to contact us  Please call the surgical office immediately if you notice any of the following: Excessive pain, persistent fever, excessive bleeding or swelling, redness surrounding the wound, drainage from the wound, reactions to medications, or any general concerns or worsening of overall condition.  The breast surgery office can be reached at ([phone]).    ANESTHESIA DISCHARGE INSTRUCTIONS    Wear your seatbelt home.  You are under the influence of drugs-do not drink alcohol,drive,operate machinery,or make any important decisions or sign any legal documentsfor 24 hours  A responsible adult needs to be with you for 24 hours.  You may experience lightheadedness,dizziness,or

## 2025-04-04 ENCOUNTER — PATIENT MESSAGE (OUTPATIENT)
Dept: FAMILY MEDICINE CLINIC | Age: 80
End: 2025-04-04

## 2025-04-04 ENCOUNTER — RESULTS FOLLOW-UP (OUTPATIENT)
Dept: SURGERY | Age: 80
End: 2025-04-04

## 2025-04-07 ENCOUNTER — TELEPHONE (OUTPATIENT)
Dept: FAMILY MEDICINE CLINIC | Age: 80
End: 2025-04-07

## 2025-04-07 DIAGNOSIS — G47.00 INSOMNIA, UNSPECIFIED TYPE: ICD-10-CM

## 2025-04-07 RX ORDER — ZOLPIDEM TARTRATE 6.25 MG/1
6.25 TABLET, FILM COATED, EXTENDED RELEASE ORAL NIGHTLY PRN
Qty: 90 TABLET | Refills: 0 | Status: SHIPPED | OUTPATIENT
Start: 2025-04-07 | End: 2025-04-08 | Stop reason: SDUPTHER

## 2025-04-07 RX ORDER — AMOXICILLIN 500 MG/1
2000 CAPSULE ORAL ONCE
Qty: 4 CAPSULE | Refills: 0 | Status: SHIPPED | OUTPATIENT
Start: 2025-04-07 | End: 2025-04-07

## 2025-04-07 NOTE — TELEPHONE ENCOUNTER
Pharmacy requesting PA for following medication refill  Patient requesting refill of...zolpidem (AMBIEN CR) 6.25 MG extended release tablet [[id number]]  ENDED    Order Details    Dose: 6.25 mg Route: Oral Frequency: NIGHTLY PRN for Sleep   Dispense Quantity: 90 tablet Refills: 0          Sig: Take 1 tablet by mouth nightly as needed for Sleep for up to 90 days. Max Daily Amount: 6.25 mg             Last visit date: 3/24/2025    [facility] - [address] - P [phone] - F [phone]

## 2025-04-08 DIAGNOSIS — G47.00 INSOMNIA, UNSPECIFIED TYPE: ICD-10-CM

## 2025-04-08 NOTE — TELEPHONE ENCOUNTER
Medication:   Requested Prescriptions     Pending Prescriptions Disp Refills    zolpidem (AMBIEN CR) 6.25 MG extended release tablet 90 tablet 0     Sig: Take 1 tablet by mouth nightly as needed for Sleep for up to 90 days. Max Daily Amount: 6.25 mg        Last Filled:  04/07/2025 #90 0rf     Patient Phone Number: [phone] (home)     Last appt: 3/24/2025   Next appt: 8/18/2025    Last OARRS:       2/19/2025     2:59 PM   RX Monitoring   Periodic Controlled Substance Monitoring Possible medication side effects, risk of tolerance/dependence & alternative treatments discussed.;No signs of potential drug abuse or diversion identified.

## 2025-04-08 NOTE — TELEPHONE ENCOUNTER
Pt called in saying pharmacy denied PA because there was two questions that was not answered yes. She is down to a couple pills and is afraid she will end up in the hospital.    Please advise

## 2025-04-08 NOTE — TELEPHONE ENCOUNTER
Submitted PA for Zolpidem Tartrate ER 6.25MG er tablets   Via CM Key: [id number]  STATUS: PENDING.    Follow up done daily; if no decision with in three days we will refax.  If another three days goes by with no decision will call the insurance for status.

## 2025-04-08 NOTE — TELEPHONE ENCOUNTER
The medication was DENIED; DENIAL letter is uploaded to MEDIA.    Generic Denial:  Other; please see Denial Letter.           Note :  Your plan does not allow coverage of this medication based on your prescriber answering NO to the following question(s): The use of this medication is potentially inappropriate in older adults, meaning it is best avoided, prescribed at reduced dosage, or used with caution or carefully monitored. Has the patient tried the nonHRM (non-High Risk Medication) alternative drug doxepin (3 mg or 6 mg)? Does the patient have a contraindication to the non-HRM (non-High Risk Medication) alternative drug doxepin (3 mg or 6 mg)?       If you want an APPEAL; please note in this encounter what new information you would like to APPEAL with.  Once complete route back to PA POOL.    If this requires a response please respond to the pool ( P [facility] P[facility] MEDICATION PRE-AUTH).      Thank you please advise patient.

## 2025-04-09 RX ORDER — ZOLPIDEM TARTRATE 6.25 MG/1
6.25 TABLET, FILM COATED, EXTENDED RELEASE ORAL NIGHTLY PRN
Qty: 90 TABLET | Refills: 0 | Status: SHIPPED | OUTPATIENT
Start: 2025-04-09 | End: 2025-07-08

## 2025-04-09 NOTE — TELEPHONE ENCOUNTER
Please infrom pt that ambien is not covered unless she tries doxepin first. No record of her trying this with Dr. Aguirre. Has she tried it in past?   If not we may need to switch to it. Or can pay for ambien out of pocket

## 2025-04-09 NOTE — TELEPHONE ENCOUNTER
Pt called back and was informed. She has never tried Doxepin before but is willing to.     She would like script sent to     [id number] - [place], [place] - [address] - P [phone] - F [phone]

## 2025-04-10 RX ORDER — DOXEPIN HYDROCHLORIDE 10 MG/1
10 CAPSULE ORAL NIGHTLY
Qty: 30 CAPSULE | Refills: 3 | Status: SHIPPED | OUTPATIENT
Start: 2025-04-10 | End: 2025-04-10

## 2025-04-10 RX ORDER — ZOLPIDEM TARTRATE 6.25 MG/1
6.25 TABLET, FILM COATED, EXTENDED RELEASE ORAL NIGHTLY PRN
Qty: 90 TABLET | Refills: 0 | Status: SHIPPED | OUTPATIENT
Start: 2025-04-10 | End: 2025-07-09

## 2025-04-10 NOTE — TELEPHONE ENCOUNTER
Please resubmit for Zolpidem. Pt will use [employer] coupon and be self pay. Please make it for 90 days

## 2025-04-16 ENCOUNTER — OFFICE VISIT (OUTPATIENT)
Dept: SURGERY | Age: 80
End: 2025-04-16

## 2025-04-16 VITALS
OXYGEN SATURATION: 98 % | SYSTOLIC BLOOD PRESSURE: 134 MMHG | RESPIRATION RATE: 16 BRPM | BODY MASS INDEX: 24.16 KG/M2 | DIASTOLIC BLOOD PRESSURE: 76 MMHG | WEIGHT: 145 LBS | HEART RATE: 67 BPM | HEIGHT: 65 IN

## 2025-04-16 DIAGNOSIS — C50.911 PRIMARY BREAST MALIGNANCY, RIGHT: Primary | ICD-10-CM

## 2025-04-16 DIAGNOSIS — Z09 POSTOP CHECK: ICD-10-CM

## 2025-04-16 PROCEDURE — 99024 POSTOP FOLLOW-UP VISIT: CPT | Performed by: SURGERY

## 2025-04-16 NOTE — PROGRESS NOTES
PCP:  Medical Oncology:  Radiation:  Other:      pT1cN0  STAGE:  IA right breast cancer      Ms. Lomeli is a 79 y.o.-year-old woman who is now s/p right partial mastectomy with sentinel lymph node biospy for right breast cancer.  She underwent this procedure on  2025  and tolerated it well.  She is doing quite well postoperatively and her pain is continuing to improve.      INTERVAL HISTORY:  On 2025 she underwent right breast partial mastectomy with sentinel lymph node biopsy.  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  ER positive KS negative HER2 negative.  Margins were negative.  There were 0/2 lymph nodes involved with carcinoma. [id number]       Pathology:    Department of Pathology   ADDENDUM SURGICAL PATHOLOGY REPORT   Patient Name:  LISA LOMELI             Accession No:  [id number]    Age Sex:   1945    79 Y / F       Location:      [facility]   Account No:    [id number]                  Collected:     2025   Med Rec No:    [id number]                 Received:      2025   Attend Phys:   TORO WEIR             Completed:     2025   Perform Phys:  TORO WEIR             ADDENDUM:   At the request of Dr. Valeriano Quintana, the pathologist whose signature appears   below reviewed the original pathology report, examined candidate H&E   slides, and selected the block (A6) appropriate to the specifications of   the ordered molecular analysis. This FFPE tissue block is being submitted   to [facility] for next generation sequencing testing on 2025. An   addendum report will be issued when the results of this molecular test   are available.           MYAA UNDERWOOD M.D., PH.D.   (Electronic Signature)   2025       FINAL DIAGNOSIS:        A. Right breast tissue, lumpectomy:        - Invasive carcinoma of no special type (ductal), Histologic Grade 2          ([place] Histologic Score)- See Comment/ Case Summary.        -

## 2025-04-18 DIAGNOSIS — K21.9 GASTROESOPHAGEAL REFLUX DISEASE, UNSPECIFIED WHETHER ESOPHAGITIS PRESENT: ICD-10-CM

## 2025-04-21 RX ORDER — PANTOPRAZOLE SODIUM 40 MG/1
40 TABLET, DELAYED RELEASE ORAL DAILY
Qty: 90 TABLET | Refills: 1 | Status: SHIPPED | OUTPATIENT
Start: 2025-04-21 | End: 2025-04-22 | Stop reason: SDUPTHER

## 2025-04-21 NOTE — TELEPHONE ENCOUNTER
Medication:   Requested Prescriptions     Pending Prescriptions Disp Refills    pantoprazole (PROTONIX) 40 MG tablet 90 tablet 1        Last Filled:  12/04/2024    Patient Phone Number: [phone] (home)     Last appt: 12/10/2024   Next appt: Visit date not found    Last OARRS:       2/19/2025     2:59 PM   RX Monitoring   Periodic Controlled Substance Monitoring Possible medication side effects, risk of tolerance/dependence & alternative treatments discussed.;No signs of potential drug abuse or diversion identified.

## 2025-04-22 DIAGNOSIS — K21.9 GASTROESOPHAGEAL REFLUX DISEASE, UNSPECIFIED WHETHER ESOPHAGITIS PRESENT: ICD-10-CM

## 2025-04-22 RX ORDER — PANTOPRAZOLE SODIUM 40 MG/1
40 TABLET, DELAYED RELEASE ORAL DAILY
Qty: 90 TABLET | Refills: 1 | Status: SHIPPED | OUTPATIENT
Start: 2025-04-22

## 2025-04-22 NOTE — TELEPHONE ENCOUNTER
Medication:   Requested Prescriptions     Pending Prescriptions Disp Refills    pantoprazole (PROTONIX) 40 MG tablet 90 tablet 1     Sig: Take 1 tablet by mouth daily        Last Filled:      Patient Phone Number: [phone] (home)     Last appt: 12/10/2024   Next appt: Visit date not found    Last OARRS:       2/19/2025     2:59 PM   RX Monitoring   Periodic Controlled Substance Monitoring Possible medication side effects, risk of tolerance/dependence & alternative treatments discussed.;No signs of potential drug abuse or diversion identified.

## 2025-05-01 DIAGNOSIS — G47.00 INSOMNIA, UNSPECIFIED TYPE: ICD-10-CM

## 2025-05-01 RX ORDER — ZOLPIDEM TARTRATE 6.25 MG/1
TABLET, FILM COATED, EXTENDED RELEASE ORAL
Qty: 90 TABLET | Refills: 0 | Status: SHIPPED | OUTPATIENT
Start: 2025-05-01 | End: 2025-07-30

## 2025-05-01 NOTE — TELEPHONE ENCOUNTER
Medication:   Requested Prescriptions     Pending Prescriptions Disp Refills    zolpidem (AMBIEN CR) 6.25 MG extended release tablet [Pharmacy Med Name: ZOLPIDEM ER  TAB 6.25MG] 90 tablet 0     Sig: TAKE 1 TABLET NIGHTLY AS   NEEDED FOR SLEEP. MAXIMUM  DAILY AMOUNT:6.25MG.        Last Filled:  4/9/2025    Patient Phone Number: [phone] (home)     Last appt: 3/24/2025   Next appt: 8/18/2025    Last OARRS:       2/19/2025     2:59 PM   RX Monitoring   Periodic Controlled Substance Monitoring Possible medication side effects, risk of tolerance/dependence & alternative treatments discussed.;No signs of potential drug abuse or diversion identified.

## 2025-07-01 DIAGNOSIS — G47.00 INSOMNIA, UNSPECIFIED TYPE: ICD-10-CM

## 2025-07-01 NOTE — TELEPHONE ENCOUNTER
Medication:   Requested Prescriptions     Pending Prescriptions Disp Refills    zolpidem (AMBIEN CR) 6.25 MG extended release tablet 90 tablet 0     Sig: Take 1 tablet by mouth nightly as needed for Sleep for up to 90 days. Max Daily Amount: 6.25 mg        Last Filled:  05/01/2025 #90 0rf     Patient Phone Number: [phone] (home)     Last appt: 3/24/2025   Next appt: 8/18/2025    Last OARRS:       2/19/2025     2:59 PM   RX Monitoring   Periodic Controlled Substance Monitoring Possible medication side effects, risk of tolerance/dependence & alternative treatments discussed.;No signs of potential drug abuse or diversion identified.

## 2025-07-01 NOTE — TELEPHONE ENCOUNTER
Patient requesting refill of...zolpidem (AMBIEN CR) 6.25 MG extended release tablet [[id number]]    Order Details  Dose, Route, Frequency: As Directed   Dispense Quantity: 90 tablet Refills: 0          Sig: TAKE 1 TABLET NIGHTLY AS   NEEDED FOR SLEEP. MAXIMUM  DAILY AMOUNT:6.25MG.       Last visit date: 3/24/2025    Pharm[facility]...[facility] [place] - [address] - P [phone] - F [phone]

## 2025-07-02 RX ORDER — ZOLPIDEM TARTRATE 6.25 MG/1
6.25 TABLET, FILM COATED, EXTENDED RELEASE ORAL NIGHTLY PRN
Qty: 90 TABLET | Refills: 0 | Status: SHIPPED | OUTPATIENT
Start: 2025-07-02 | End: 2025-09-30

## 2025-07-08 ENCOUNTER — TELEPHONE (OUTPATIENT)
Dept: ADMINISTRATIVE | Age: 80
End: 2025-07-08

## 2025-07-08 NOTE — TELEPHONE ENCOUNTER
Submitted PA for Zolpidem Tartrate ER 6.25MG er tablets   Via CMM Key: [id number]  STATUS: The patient currently has access to the requested medication and a Prior Authorization is not needed for the patient/medication.

## 2025-07-21 NOTE — PROGRESS NOTES
Medical Oncology:  Radiation:  Other:        pT1cN0  STAGE:  IA right breast cancer      Ms. Lomeli is a 79 y.o.-year-old woman who initially presented to me with  right breast cancer.  Since her postoperative visit Ms. Lomeli has been doing quite well.  Her adjuvant treatment has included radiation and endocrine therapy.  She is not noticing side effect of the endocrine therapy.  She has no new breast related concerns today.      INTERVAL HISTORY:    On 4/2/2025 she underwent right breast partial mastectomy with sentinel lymph node biopsy. 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 ER positive LA negative HER2 negative. Margins were negative. There were 0/2 lymph nodes involved with carcinoma. [id number]     On 7/30/2025 she will complete adjuvant radiation    Exam:  Physical exam has been reviewed and updated  General: no acute distress  Breast:  The patient was examined in the upright and supine position. There is a well healed scar on the right breast. There is a similarly well healed ipsilateral axillary scar. There are expected  post surgical and radiation related changes.  She has moderate range of motion with her arm.  Her contralateral breast shows no new masses or changes in breast contour.  There were no skin changes of the breast or nipple areolar complex.  There was no nipple inversion or discharge.   There is no axillary lymphadenopathy palpated bilaterally.  Respiratory: respirations are non-labored and there is no audible distress  Cardiovascular: regular rate, extremities appear well perfused  Neurologic: alert, oriented      Assessment/Plan:  pT1cN0  STAGE:  IA right breast cancer  ER positive LA negative HER2 negative  S/p PM with SLNB  Undergoing XRT      Taking Arimidex      I reviewed her physical exam findings.  There are no current signs of malignancy.    Signs/symptoms of malignancy were reviewed. She verbalizes understanding that she should notify our

## 2025-07-24 ENCOUNTER — OFFICE VISIT (OUTPATIENT)
Dept: SURGERY | Age: 80
End: 2025-07-24
Payer: MEDICARE

## 2025-07-24 VITALS
HEART RATE: 58 BPM | RESPIRATION RATE: 16 BRPM | BODY MASS INDEX: 23.82 KG/M2 | WEIGHT: 143 LBS | HEIGHT: 65 IN | OXYGEN SATURATION: 95 %

## 2025-07-24 DIAGNOSIS — C50.911 PRIMARY BREAST MALIGNANCY, RIGHT (HCC): Primary | ICD-10-CM

## 2025-07-24 DIAGNOSIS — Z85.3 PERSONAL HISTORY OF BREAST CANCER: Primary | ICD-10-CM

## 2025-07-24 DIAGNOSIS — C50.911 PRIMARY BREAST MALIGNANCY, RIGHT (HCC): ICD-10-CM

## 2025-07-24 PROCEDURE — 1123F ACP DISCUSS/DSCN MKR DOCD: CPT | Performed by: SURGERY

## 2025-07-24 PROCEDURE — 99214 OFFICE O/P EST MOD 30 MIN: CPT | Performed by: SURGERY

## 2025-07-24 PROCEDURE — 1126F AMNT PAIN NOTED NONE PRSNT: CPT | Performed by: SURGERY

## 2025-07-24 PROCEDURE — 1159F MED LIST DOCD IN RCRD: CPT | Performed by: SURGERY

## 2025-07-24 RX ORDER — ANASTROZOLE 1 MG/1
1 TABLET ORAL DAILY
COMMUNITY

## 2025-07-27 DIAGNOSIS — I10 ESSENTIAL HYPERTENSION: ICD-10-CM

## 2025-07-28 RX ORDER — LISINOPRIL 30 MG/1
30 TABLET ORAL DAILY
Qty: 90 TABLET | Refills: 1 | Status: SHIPPED | OUTPATIENT
Start: 2025-07-28

## 2025-07-28 NOTE — TELEPHONE ENCOUNTER
Medication:   Requested Prescriptions     Pending Prescriptions Disp Refills    lisinopril (PRINIVIL;ZESTRIL) 30 MG tablet [Pharmacy Med Name: LISINOPRIL TAB 30MG] 90 tablet 1     Sig: TAKE 1 TABLET DAILY        Last Filled:  2/19/2025    Patient Phone Number: [phone] (home)     Last appt: 3/24/2025   Next appt: 8/18/2025    Last OARRS:       2/19/2025     2:59 PM   RX Monitoring   Periodic Controlled Substance Monitoring Possible medication side effects, risk of tolerance/dependence & alternative treatments discussed.;No signs of potential drug abuse or diversion identified.

## 2025-07-30 ENCOUNTER — TELEPHONE (OUTPATIENT)
Dept: FAMILY MEDICINE CLINIC | Age: 80
End: 2025-07-30

## 2025-07-30 DIAGNOSIS — G47.00 INSOMNIA, UNSPECIFIED TYPE: ICD-10-CM

## 2025-07-30 RX ORDER — ZOLPIDEM TARTRATE 6.25 MG/1
6.25 TABLET, FILM COATED, EXTENDED RELEASE ORAL NIGHTLY PRN
Qty: 90 TABLET | Refills: 0 | Status: SHIPPED | OUTPATIENT
Start: 2025-07-30 | End: 2025-10-28

## 2025-07-30 NOTE — TELEPHONE ENCOUNTER
Medication was not received by patient and [facility] won't do anything. They advised her to call the provider and have a new script sent over to her local pharmacy.     Patient requesting refill of...zolpidem (AMBIEN CR) 6.25 MG extended release tablet [[id number]]    Order Details  Dose: 6.25 mg Route: Oral Frequency: NIGHTLY PRN for Sleep   Dispense Quantity: 90 tablet Refills: 0          Sig: Take 1 tablet by mouth nightly as needed for Sleep for up to 90 days. Max Daily Amount: 6.25 mg       Last visit date: 3/24/2025    Pharmacy...[id number] - [facility] [address] - P [phone] - F [phone]

## 2025-07-31 NOTE — TELEPHONE ENCOUNTER
Pharmacy will not fill medication for patient without fax from them being signed off by the PCP. Please be on the lookout for the fax.

## 2025-07-31 NOTE — TELEPHONE ENCOUNTER
Pt said that pharmacy is unable to fax paperwork that needs signed. They need a call from the Dr to be able to refill medication.    Please advise

## 2025-07-31 NOTE — TELEPHONE ENCOUNTER
Please advise; thank you!    Patient Phone Number: [phone] (home)     Last appt: 3/24/2025   Next appt: 8/18/2025

## 2025-08-18 ENCOUNTER — OFFICE VISIT (OUTPATIENT)
Dept: FAMILY MEDICINE CLINIC | Age: 80
End: 2025-08-18

## 2025-08-18 VITALS
SYSTOLIC BLOOD PRESSURE: 118 MMHG | BODY MASS INDEX: 23.82 KG/M2 | HEIGHT: 65 IN | DIASTOLIC BLOOD PRESSURE: 76 MMHG | HEART RATE: 84 BPM | WEIGHT: 143 LBS

## 2025-08-18 DIAGNOSIS — Z85.3 HX: BREAST CANCER: ICD-10-CM

## 2025-08-18 DIAGNOSIS — I10 ESSENTIAL HYPERTENSION: ICD-10-CM

## 2025-08-18 DIAGNOSIS — M81.0 AGE-RELATED OSTEOPOROSIS WITHOUT CURRENT PATHOLOGICAL FRACTURE: ICD-10-CM

## 2025-08-18 DIAGNOSIS — Z00.00 MEDICARE ANNUAL WELLNESS VISIT, SUBSEQUENT: Primary | ICD-10-CM

## 2025-08-18 DIAGNOSIS — R41.3 MEMORY DISORDER: ICD-10-CM

## 2025-08-18 ASSESSMENT — PATIENT HEALTH QUESTIONNAIRE - PHQ9
SUM OF ALL RESPONSES TO PHQ QUESTIONS 1-9: 0
1. LITTLE INTEREST OR PLEASURE IN DOING THINGS: NOT AT ALL
2. FEELING DOWN, DEPRESSED OR HOPELESS: NOT AT ALL
SUM OF ALL RESPONSES TO PHQ QUESTIONS 1-9: 0

## 2025-08-18 ASSESSMENT — LIFESTYLE VARIABLES
HOW MANY STANDARD DRINKS CONTAINING ALCOHOL DO YOU HAVE ON A TYPICAL DAY: 1 OR 2
HOW OFTEN DO YOU HAVE A DRINK CONTAINING ALCOHOL: 2-3 TIMES A WEEK

## 2025-08-22 DIAGNOSIS — I10 ESSENTIAL HYPERTENSION: ICD-10-CM

## 2025-08-22 LAB
ALBUMIN SERPL-MCNC: 4 G/DL (ref 3.4–5)
ALBUMIN/GLOB SERPL: 1.9 {RATIO} (ref 1.1–2.2)
ALP SERPL-CCNC: 68 U/L (ref 40–129)
ALT SERPL-CCNC: 15 U/L (ref 10–40)
ANION GAP SERPL CALCULATED.3IONS-SCNC: 10 MMOL/L (ref 3–16)
AST SERPL-CCNC: 21 U/L (ref 15–37)
BASOPHILS # BLD: 0 K/UL (ref 0–0.2)
BASOPHILS NFR BLD: 1.5 %
BILIRUB SERPL-MCNC: 0.3 MG/DL (ref 0–1)
BUN SERPL-MCNC: 16 MG/DL (ref 7–20)
CALCIUM SERPL-MCNC: 8.9 MG/DL (ref 8.3–10.6)
CHLORIDE SERPL-SCNC: 105 MMOL/L (ref 99–110)
CHOLEST SERPL-MCNC: 194 MG/DL (ref 0–199)
CO2 SERPL-SCNC: 26 MMOL/L (ref 21–32)
CREAT SERPL-MCNC: 0.7 MG/DL (ref 0.6–1.2)
DEPRECATED RDW RBC AUTO: 14.2 % (ref 12.4–15.4)
EOSINOPHIL # BLD: 0.3 K/UL (ref 0–0.6)
EOSINOPHIL NFR BLD: 9.1 %
GFR SERPLBLD CREATININE-BSD FMLA CKD-EPI: 89 ML/MIN/{1.73_M2}
GLUCOSE SERPL-MCNC: 99 MG/DL (ref 70–99)
HCT VFR BLD AUTO: 37.1 % (ref 36–48)
HDLC SERPL-MCNC: 79 MG/DL (ref 40–60)
HGB BLD-MCNC: 12.6 G/DL (ref 12–16)
LDLC SERPL CALC-MCNC: 102 MG/DL
LYMPHOCYTES # BLD: 0.7 K/UL (ref 1–5.1)
LYMPHOCYTES NFR BLD: 22.8 %
MCH RBC QN AUTO: 30.3 PG (ref 26–34)
MCHC RBC AUTO-ENTMCNC: 34 G/DL (ref 31–36)
MCV RBC AUTO: 89.2 FL (ref 80–100)
MONOCYTES # BLD: 0.4 K/UL (ref 0–1.3)
MONOCYTES NFR BLD: 13.7 %
NEUTROPHILS # BLD: 1.6 K/UL (ref 1.7–7.7)
NEUTROPHILS NFR BLD: 52.9 %
PLATELET # BLD AUTO: 223 K/UL (ref 135–450)
PMV BLD AUTO: 8.4 FL (ref 5–10.5)
POTASSIUM SERPL-SCNC: 4.6 MMOL/L (ref 3.5–5.1)
PROT SERPL-MCNC: 6.1 G/DL (ref 6.4–8.2)
RBC # BLD AUTO: 4.16 M/UL (ref 4–5.2)
SODIUM SERPL-SCNC: 141 MMOL/L (ref 136–145)
TRIGL SERPL-MCNC: 66 MG/DL (ref 0–150)
TSH SERPL DL<=0.005 MIU/L-ACNC: 0.88 UIU/ML (ref 0.27–4.2)
VLDLC SERPL CALC-MCNC: 13 MG/DL
WBC # BLD AUTO: 3.1 K/UL (ref 4–11)

## (undated) DEVICE — HYPODERMIC SAFETY NEEDLE: Brand: MAGELLAN

## (undated) DEVICE — SUTURE VICRYL + SZ 3-0 L18IN ABSRB UD SH 1/2 CIR TAPERCUT NDL VCP864D

## (undated) DEVICE — Device

## (undated) DEVICE — SYRINGE INFL 60ML DISP ALLIANCE II

## (undated) DEVICE — APPLIER CLP L9.38IN M LIG TI DISP STR RNG HNDL LIGACLP

## (undated) DEVICE — SOLUTION IV IRRIG WATER 500ML POUR BRL ST 2F7113

## (undated) DEVICE — SYRINGE MED 10ML LUERLOCK TIP W/O SFTY DISP

## (undated) DEVICE — PROVE COVER: Brand: UNBRANDED

## (undated) DEVICE — BREAST: Brand: MEDLINE INDUSTRIES, INC.

## (undated) DEVICE — BLANKET WRM W40.2XL55.9IN IORT LO BODY + MISTRAL AIR

## (undated) DEVICE — ELECTRODE PT RET AD L9FT HI MOIST COND ADH HYDRGEL CORDED

## (undated) DEVICE — SUTURE MONOCRYL + SZ 3-0 L27IN ABSRB UD L26MM SH 1/2 CIR MCP416H

## (undated) DEVICE — SYRINGE MED 5ML STD CLR PLAS LUERLOCK TIP N CTRL DISP

## (undated) DEVICE — SUTURE VICRYL + SZ 3-0 L27IN ABSRB UD L26MM SH 1/2 CIR VCP416H

## (undated) DEVICE — INTENDED USE FOR SURGICAL MARKING ON INTACT SKIN, ALSO PROVIDES A PERMANENT METHOD OF IDENTIFYING OBJECTS IN THE OPERATING ROOM: Brand: WRITESITE® PLUS MINI PREP RESISTANT MARKER

## (undated) DEVICE — PROBE SET W/DRAPE

## (undated) DEVICE — PENCIL SMK EVAC TELSCP 3 M TBNG

## (undated) DEVICE — NEEDLE,22GX1.5",REG,BEVEL: Brand: MEDLINE

## (undated) DEVICE — GLOVE SURG SZ 6.5 L11.2IN FNGR THK9.8MIL STRW LTX POLYMER

## (undated) DEVICE — YANKAUER,BULB TIP,W/O VENT,RIGID,STERILE: Brand: MEDLINE

## (undated) DEVICE — PROCEDURE KIT ENDOSCP CUST

## (undated) DEVICE — DILATOR ENDOSCP L180CM DIA6FR BLLN L8CM DIA54-60FR

## (undated) DEVICE — LIQUIBAND RAPID ADHESIVE 36/CS 0.8ML: Brand: MEDLINE

## (undated) DEVICE — GAUZE,SPONGE,4"X4",8PLY,STRL,LF,10/TRAY: Brand: MEDLINE

## (undated) DEVICE — BW-412T DISP COMBO CLEANING BRUSH: Brand: SINGLE USE COMBINATION CLEANING BRUSH

## (undated) DEVICE — GLOVE SURG SZ 7 L12IN THK7.5MIL DK GRN LTX FREE MSG6570] MEDLINE INDUSTRIES INC]

## (undated) DEVICE — STAPLER SKIN H3.9MM WIRE DIA0.58MM CRWN 6.9MM 35 STPL ROT

## (undated) DEVICE — SPONGE,PEANUT,XRAY,ST,SM,3/8",5/CARD: Brand: MEDLINE INDUSTRIES, INC.

## (undated) DEVICE — GOWN SIRUS NONREIN XL W/TWL: Brand: MEDLINE INDUSTRIES, INC.

## (undated) DEVICE — SUTURE MONOCRYL + SZ 4-0 L27IN ABSRB UD L19MM PS-2 3/8 CIR MCP426H

## (undated) DEVICE — SPONGE LAP W18XL18IN WHT COT 4 PLY FLD STRUNG RADPQ DISP ST 2 PER PACK

## (undated) DEVICE — MOUTHPIECE ENDOSCP L CTRL OPN AND SIDE PORTS DISP

## (undated) DEVICE — SOLUTION IRRIG 500ML 0.9% SOD CHLO USP POUR PLAS BTL